# Patient Record
Sex: FEMALE | Race: WHITE | NOT HISPANIC OR LATINO | Employment: OTHER | URBAN - METROPOLITAN AREA
[De-identification: names, ages, dates, MRNs, and addresses within clinical notes are randomized per-mention and may not be internally consistent; named-entity substitution may affect disease eponyms.]

---

## 2018-01-10 NOTE — RESULT NOTES
Message   normal cyst     Verified Results  (1) TISSUE EXAM 18Apr2016 11:22AM Rosemary Metz Order Number: [id number]     Test Name Result Flag Reference   LAB AP CASE REPORT (Report)     Surgical Pathology Report             Case: [id number]                   Authorizing Provider: Roselia Frankel MD     Collected:      04/18/2016 1122        Pathologist:      Juan Mandel MD      Received:      04/19/2016 1146        Specimen:  Skin, Other, Posterior neck   LAB AP FINAL DIAGNOSIS      A  Skin, posterior neck, excision:  - Epidermal inclusion cyst, fragmented  Interpretation performed at [place] [address]  LAB AP SURGICAL ADDITIONAL INFORMATION (Report)     These tests were developed and their performance characteristics   determined by [address] [place] [address] or [employer]  They may not be cleared or approved by the [place]  Food and   Drug Administration  The FDA has determined that such clearance or   approval is not necessary  These tests are used for clinical purposes  They should not be regarded as investigational or for research  This   laboratory has been approved by [facility], designated as a high-complexity   laboratory and is qualified to perform these tests  LAB AP GROSS DESCRIPTION (Report)     A  The specimen is received in formalin, labeled with the patient's name   and hospital number, and is designated posterior neck, follicular cyst    The specimen consists of multiple ragged, light tan to yellow, irregular   shaped, portions of soft, fibromembranous, and focally fatty tissue,   suggestive of disrupted/ruptured, cystic structure, measuring in aggregate   2 4 x 2 3 x 0 4 centimeters  Entirely submitted  One cassette  Note: The estimated total formalin fixation time based upon information   provided by the submitting clinician and the standard processing schedule   is 57 25 hours   [facility]   LAB AP CLINICAL INFORMATION      TW Order Number: [id number]  Follicular cyst

## 2018-01-18 NOTE — RESULT NOTES
Verified Results  (1) TISSUE EXAM 18Apr2016 11:22AM Pete Hoover Order Number: [id number]     Test Name Result Flag Reference   LAB AP CASE REPORT (Report)     Surgical Pathology Report             Case: [id number]                   Authorizing Provider: Zachary Linton MD     Collected:      04/18/2016 1122        Pathologist:      Corinna Caicedo MD      Received:      04/19/2016 1146        Specimen:  Skin, Other, Posterior neck   LAB AP FINAL DIAGNOSIS      A  Skin, posterior neck, excision:  - Epidermal inclusion cyst, fragmented  Interpretation performed at [place] [address]  LAB AP SURGICAL ADDITIONAL INFORMATION (Report)     These tests were developed and their performance characteristics   determined by [facility] or [employer]  They may not be cleared or approved by the [place]  Food and   Drug Administration  The FDA has determined that such clearance or   approval is not necessary  These tests are used for clinical purposes  They should not be regarded as investigational or for research  This   laboratory has been approved by [place] [address], designated as a high-complexity   laboratory and is qualified to perform these tests  LAB AP GROSS DESCRIPTION (Report)     A  The specimen is received in formalin, labeled with the patient's name   and hospital number, and is designated posterior neck, follicular cyst    The specimen consists of multiple ragged, light tan to yellow, irregular   shaped, portions of soft, fibromembranous, and focally fatty tissue,   suggestive of disrupted/ruptured, cystic structure, measuring in aggregate   2 4 x 2 3 x 0 4 centimeters  Entirely submitted  One cassette  Note: The estimated total formalin fixation time based upon information   provided by the submitting clinician and the standard processing schedule   is 57 25 hours   [facility]   LAB AP CLINICAL INFORMATION      TW Order Number: [id number]  Follicular cyst

## 2018-03-27 LAB
ABSOL LYMPHOCYTES (HISTORICAL): 0.8 K/UL (ref 0.5–4)
ALBUMIN SERPL BCP-MCNC: 3.5 G/DL (ref 3–5.2)
ALP SERPL-CCNC: 120 U/L (ref 43–122)
ALT SERPL W P-5'-P-CCNC: 37 U/L (ref 9–52)
ANION GAP SERPL CALCULATED.3IONS-SCNC: 12 MMOL/L (ref 5–14)
AST SERPL W P-5'-P-CCNC: 33 U/L (ref 14–36)
BASOPHILS # BLD AUTO: 0 % (ref 0–1)
BASOPHILS # BLD AUTO: 0 K/UL (ref 0–0.1)
BILIRUB SERPL-MCNC: 2.7 MG/DL
BUN SERPL-MCNC: 13 MG/DL (ref 5–25)
CALCIUM SERPL-MCNC: 9.3 MG/DL (ref 8.4–10.2)
CHLORIDE SERPL-SCNC: 99 MEQ/L (ref 97–108)
CO2 SERPL-SCNC: 25 MMOL/L (ref 22–30)
CREATINE, SERUM (HISTORICAL): 0.6 MG/DL (ref 0.6–1.2)
DEPRECATED RDW RBC AUTO: 14.8 %
EGFR (HISTORICAL): >60 ML/MIN/1.73 M2
EOSINOPHIL # BLD AUTO: 0 K/UL (ref 0–0.4)
EOSINOPHIL NFR BLD AUTO: 0 % (ref 0–6)
GLUCOSE SERPL-MCNC: 258 MG/DL (ref 70–99)
GLUCOSE SERPL-MCNC: 277 MG/DL (ref 70–99)
GLUCOSE SERPL-MCNC: 298 MG/DL (ref 70–99)
GLUCOSE SERPL-MCNC: 350 MG/DL (ref 70–99)
HCT VFR BLD AUTO: 40.3 % (ref 36–46)
HGB BLD-MCNC: 13.3 G/DL (ref 12–16)
LYMPHOCYTES NFR BLD AUTO: 11 % (ref 25–45)
MCH RBC QN AUTO: 26.8 PG (ref 26–34)
MCHC RBC AUTO-ENTMCNC: 33.1 % (ref 31–36)
MCV RBC AUTO: 81 FL (ref 80–100)
MONOCYTES # BLD AUTO: 0.1 K/UL (ref 0.2–0.9)
MONOCYTES NFR BLD AUTO: 1 % (ref 1–10)
NEUTROPHILS ABS COUNT (HISTORICAL): 6.4 K/UL (ref 1.8–7.8)
NEUTS SEG NFR BLD AUTO: 88 % (ref 45–65)
PLATELET # BLD AUTO: 127 K/MCL (ref 150–450)
POTASSIUM SERPL-SCNC: 4.9 MEQ/L (ref 3.6–5)
RBC # BLD AUTO: 4.97 M/MCL (ref 4–5.2)
SODIUM SERPL-SCNC: 135 MEQ/L (ref 137–147)
TOTAL PROTEIN (HISTORICAL): 6.5 G/DL (ref 5.9–8.4)
WBC # BLD AUTO: 7.3 K/MCL (ref 4.5–11)

## 2018-10-28 ENCOUNTER — HOSPITAL ENCOUNTER (EMERGENCY)
Facility: HOSPITAL | Age: 53
Discharge: HOME | End: 2018-10-28
Attending: EMERGENCY MEDICINE | Admitting: EMERGENCY MEDICINE
Payer: MEDICARE

## 2018-10-28 ENCOUNTER — APPOINTMENT (EMERGENCY)
Dept: RADIOLOGY | Facility: HOSPITAL | Age: 53
End: 2018-10-28
Payer: MEDICARE

## 2018-10-28 VITALS
SYSTOLIC BLOOD PRESSURE: 192 MMHG | OXYGEN SATURATION: 99 % | TEMPERATURE: 98.3 F | DIASTOLIC BLOOD PRESSURE: 93 MMHG | HEART RATE: 92 BPM | RESPIRATION RATE: 19 BRPM

## 2018-10-28 DIAGNOSIS — M25.551 RIGHT HIP PAIN: Primary | ICD-10-CM

## 2018-10-28 PROCEDURE — 99283 EMERGENCY DEPT VISIT LOW MDM: CPT

## 2018-10-28 PROCEDURE — 73552 X-RAY EXAM OF FEMUR 2/>: CPT

## 2018-10-28 PROCEDURE — 73502 X-RAY EXAM HIP UNI 2-3 VIEWS: CPT

## 2018-10-28 RX ORDER — KETOROLAC TROMETHAMINE 30 MG/ML
15 INJECTION, SOLUTION INTRAMUSCULAR; INTRAVENOUS ONCE
Status: DISCONTINUED | OUTPATIENT
Start: 2018-10-28 | End: 2018-10-28

## 2018-10-28 RX ORDER — OXYCODONE HYDROCHLORIDE 5 MG/1
5 TABLET ORAL EVERY 4 HOURS PRN
Qty: 20 TABLET | Refills: 0 | Status: SHIPPED | OUTPATIENT
Start: 2018-10-28 | End: 2018-11-02

## 2018-10-28 RX ORDER — OXYCODONE HYDROCHLORIDE AND ACETAMINOPHEN 5; 325 MG/1; MG/1
1 TABLET ORAL ONCE
Status: COMPLETED | OUTPATIENT
Start: 2018-10-28 | End: 2018-10-28

## 2018-10-28 RX ORDER — NAPROXEN 250 MG/1
500 TABLET ORAL ONCE
Status: COMPLETED | OUTPATIENT
Start: 2018-10-28 | End: 2018-10-28

## 2018-10-28 RX ORDER — OXYCODONE HYDROCHLORIDE 5 MG/1
5 TABLET ORAL ONCE
Status: COMPLETED | OUTPATIENT
Start: 2018-10-28 | End: 2018-10-28

## 2018-10-28 RX ORDER — HYDROMORPHONE HCL/PF 1 MG/ML
1 SYRINGE (ML) INJECTION ONCE
Status: DISCONTINUED | OUTPATIENT
Start: 2018-10-28 | End: 2018-10-28

## 2018-10-28 RX ADMIN — OXYCODONE HYDROCHLORIDE 5 MG: 5 TABLET ORAL at 20:50

## 2018-10-28 RX ADMIN — NAPROXEN 500 MG: 250 TABLET ORAL at 20:06

## 2018-10-28 RX ADMIN — OXYCODONE HYDROCHLORIDE AND ACETAMINOPHEN 1 TABLET: 5; 325 TABLET ORAL at 20:06
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
## 2018-10-29 NOTE — DISCHARGE INSTRUCTIONS
Please return if your hip is stuck out he cannot get back in you worsening symptoms or other concerning symptoms otherwise please follow up with her surgeon Wednesday as planned as discussed    Hip Pain   WHAT YOU NEED TO KNOW:   Hip pain can be caused by a number of conditions, such as bursitis, arthritis, or muscle or tendon strain  X-rays do not show broken bones  You may have swelling in the fluid-filled sacs that protect your muscles and tendons  Hip pain can also be caused by a lower back problem  Hip pain may be caused by trauma, playing sports, or running  Your pain may start in your hip and go to your thigh, buttock, or groin  DISCHARGE INSTRUCTIONS:   Medicines:   · NSAIDs , such as ibuprofen, help decrease swelling, pain, and fever  This medicine is available with or without a doctor's order  NSAIDs can cause stomach bleeding or kidney problems in certain people  If you take blood thinner medicine, always ask your healthcare provider if NSAIDs are safe for you  Always read the medicine label and follow directions  · Take your medicine as directed  Contact your healthcare provider if you think your medicine is not helping or if you have side effects  Tell him of her if you are allergic to any medicine  Keep a list of the medicines, vitamins, and herbs you take  Include the amounts, and when and why you take them  Bring the list or the pill bottles to follow-up visits  Carry your medicine list with you in case of an emergency  Return to the emergency department if:   · Your pain gets worse  · You have numbness in your leg or toes  · You cannot put any weight on or move your hip  Contact your healthcare provider if:   · You have a fever  · Your pain does not decrease, even after treatment  · You have questions or concerns about your condition or care  Follow up with your healthcare provider as directed: You may need physical therapy, an injection, or more testing   You may need to see an orthopedic specialist  Write down your questions so you remember to ask them during your visits  Manage your hip pain:   · Rest  your injured hip so that it can heal  You may need to avoid putting any weight on your hip for at least 48 hours  Return to normal activities as directed  · Ice  the injury for 20 minutes every 4 hours, or as directed  Use an ice pack, or put crushed ice in a plastic bag  Cover it with a towel to protect your skin  Ice helps prevent tissue damage and decreases swelling and pain  · Elevate  your injured hip above the level of your heart as often as you can  This will help decrease swelling and pain  If possible, prop your hip and leg on pillows or blankets to keep the area elevated comfortably  · Maintain a healthy weight  Extra body weight can cause pressure and pain in your hip, knee, and ankle joints  Ask your healthcare provider how much you should weigh  Ask him to help you create a weight loss plan if you are overweight  · Use assistive devices as directed  You may need to use a cane or crutches  Assistive devices help decrease pain and pressure on your hip when you walk  Ask your healthcare provider for more information about assistive devices and how to use them correctly  © 2017 [employer] Information is for End User's use only and may not be sold, redistributed or otherwise used for commercial purposes  All illustrations and images included in CareNotes® are the copyrighted property of [employer]  or [address]  The above information is an  only  It is not intended as medical advice for individual conditions or treatments  Talk to your doctor, nurse or pharmacist before following any medical regimen to see if it is safe and effective for you

## 2018-11-28 ENCOUNTER — HOSPITAL ENCOUNTER (EMERGENCY)
Facility: HOSPITAL | Age: 53
Discharge: HOME | End: 2018-11-28
Attending: EMERGENCY MEDICINE | Admitting: EMERGENCY MEDICINE
Payer: MEDICARE

## 2018-11-28 VITALS
RESPIRATION RATE: 18 BRPM | OXYGEN SATURATION: 98 % | BODY MASS INDEX: 36.1 KG/M2 | SYSTOLIC BLOOD PRESSURE: 161 MMHG | TEMPERATURE: 98.8 F | HEIGHT: 67 IN | HEART RATE: 75 BPM | DIASTOLIC BLOOD PRESSURE: 72 MMHG | WEIGHT: 230 LBS

## 2018-11-28 DIAGNOSIS — M25.551 CHRONIC RIGHT HIP PAIN: Primary | ICD-10-CM

## 2018-11-28 DIAGNOSIS — G89.29 CHRONIC RIGHT HIP PAIN: Primary | ICD-10-CM

## 2018-11-28 PROCEDURE — 99283 EMERGENCY DEPT VISIT LOW MDM: CPT

## 2018-11-28 RX ORDER — OXYCODONE HYDROCHLORIDE 10 MG/1
10 TABLET ORAL ONCE
Status: COMPLETED | OUTPATIENT
Start: 2018-11-28 | End: 2018-11-28

## 2018-11-28 RX ORDER — OXYCODONE HYDROCHLORIDE AND ACETAMINOPHEN 5; 325 MG/1; MG/1
1 TABLET ORAL EVERY 6 HOURS PRN
Qty: 15 TABLET | Refills: 0 | Status: SHIPPED | OUTPATIENT
Start: 2018-11-28 | End: 2018-12-02

## 2018-11-28 RX ADMIN — OXYCODONE HYDROCHLORIDE 10 MG: 10 TABLET ORAL at 17:49

## 2018-11-28 NOTE — ED PROVIDER NOTES
History  Chief Complaint   Patient presents with    Hip Pain     Pt presents to ED with R hip pain down leg  Was here a month ago and diagnosed with a dislocation per patient  Pt states she ran out of her pain medication last night  Pt wants to know if "the femur hits the knee cap" stating her foot rolls when attempting transfer     49 yo F with complicated history of b/l hip problems  She has had numerous surgeries to both hips  She comes in today complaining of worsening R hip pain  She reports that she recently injured it and it dislocates and will not readily relocate as usual  She has seen her Orthopedic surgeon regarding this and she reports they told her there is nothing they can do and cannot do any surgeries  She denies new areas of pain  Has chronic b/l foot neuropathy with decreased sensation, unchanged  She is wheelchair bound  She reports she follows with her PCP, Ortho and pain management for this  She reports that she ran out of her pain medications last night and sees pain management in 3 days  Per records, pt seen in ED 1 month ago for exact same issue and was given small rx to last until appt  I discussed with pt t length that she cannot use the ED for this as this is overall a chronic problem  Per drug database, last refill was 1 month ago  I gave small refill to last until appt in 3 days  I gave paperwork regarding chronic pain and she was told to follow with her doctors rather than using the ED  On exam, hip dislocates and relocates and given pt report that ortho told her there is nothing they can do, I do not feel an x-ray I necessary at this time            Prior to Admission Medications   Prescriptions Last Dose Informant Patient Reported? Taking?    ALPRAZolam (XANAX) 2 MG tablet   Yes No   Sig: Take by mouth   albuterol (VENTOLIN HFA) 90 mcg/act inhaler   Yes No   Sig: Two puffs every four hours as needed for wheezing   insulin aspart (NovoLOG) 100 Units/mL SOPN   Yes No   Sig: Inject under the skin   metFORMIN (GLUCOPHAGE) 1000 MG tablet   Yes No   Sig: Take by mouth   oxyCODONE (ROXICODONE) 30 MG immediate release tablet   Yes No   Sig: Take by mouth      Facility-Administered Medications: None       Past Medical History:   Diagnosis Date    Asthma     Diabetes mellitus ([address] )     Disease of thyroid gland     Hyperlipidemia     Hypertension     Migraine     Psychiatric disorder     anxiety, depression       Past Surgical History:   Procedure Laterality Date    HIP ARTHROPLASTY      KNEE ARTHROPLASTY      NECK SURGERY      SHOULDER ARTHROSCOPY         History reviewed  No pertinent family history  I have reviewed and agree with the history as documented  Social History   Substance Use Topics    Smoking status: Former Smoker    Smokeless tobacco: Never Used    Alcohol use No        Review of Systems   Constitutional: Negative for chills, fever and unexpected weight change  HENT: Negative for ear pain, rhinorrhea and sore throat  Eyes: Negative for pain and visual disturbance  Respiratory: Negative for cough and shortness of breath  Cardiovascular: Negative for chest pain and leg swelling  Gastrointestinal: Negative for abdominal pain, constipation, diarrhea, nausea and vomiting  Endocrine: Negative for polydipsia, polyphagia and polyuria  Genitourinary: Negative for dysuria, frequency, hematuria and urgency  Musculoskeletal: Negative for back pain, myalgias and neck pain  Skin: Negative for color change and rash  Allergic/Immunologic: Negative for environmental allergies and immunocompromised state  Neurological: Negative for dizziness, weakness, light-headedness and headaches  Hematological: Negative for adenopathy  Does not bruise/bleed easily  Psychiatric/Behavioral: Negative for agitation and confusion  All other systems reviewed and are negative  Physical Exam  Physical Exam   Constitutional: She is oriented to person, place, and time   She appears well-developed and well-nourished  HENT:   Head: Normocephalic and atraumatic  Nose: Nose normal    Mouth/Throat: Oropharynx is clear and moist    Eyes: Conjunctivae and EOM are normal    Neck: Normal range of motion  Neck supple  Cardiovascular: Normal rate, regular rhythm, normal heart sounds and intact distal pulses  Pulmonary/Chest: Effort normal and breath sounds normal  No stridor  No respiratory distress  She has no wheezes  She has no rales  She exhibits no tenderness  Abdominal: Soft  She exhibits no distension  There is no tenderness  There is no rebound and no guarding  Musculoskeletal: She exhibits deformity  She exhibits no edema  R hip: Pt sitting in bed with R leg internally rotated and can palpate hip that is dislocated, when pt straightens her leg out, the hip reduces  TTP over hip  No skin changes/swelling  Distally with decreased sensation that she reports is baseline  Normal cap refill, skin warm/pink  L hip is absent can pt can lift leg up to her face  Neurological: She is alert and oriented to person, place, and time  She exhibits normal muscle tone  Coordination normal    Skin: Skin is warm and dry  No rash noted  Psychiatric: She has a normal mood and affect  Judgment and thought content normal    Nursing note and vitals reviewed        Vital Signs  ED Triage Vitals [11/28/18 1601]   Temperature Pulse Respirations Blood Pressure SpO2   98 8 °F (37 1 °C) 75 18 140/75 97 %      Temp Source Heart Rate Source Patient Position - Orthostatic VS BP Location FiO2 (%)   Oral Monitor Sitting Left arm --      Pain Score       9           Vitals:    11/28/18 1601   BP: 140/75   Pulse: 75   Patient Position - Orthostatic VS: Sitting       Visual Acuity      ED Medications  Medications - No data to display    Diagnostic Studies  Results Reviewed     None                 No orders to display              Procedures  Procedures       Phone Contacts  ED Phone Contact    ED Course MDM  Number of Diagnoses or Management Options  Chronic right hip pain:   Diagnosis management comments: 47 yo F with chronic R hip pain with recurrent hip dislocations/reduction  Pt to f/u with PCP, ortho and pain management       Amount and/or Complexity of Data Reviewed  Tests in the radiology section of CPT®: reviewed  Review and summarize past medical records: yes      CritCare Time    Disposition  Final diagnoses:   None     ED Disposition     None      Follow-up Information    None         Patient's Medications   Discharge Prescriptions    No medications on file     No discharge procedures on file      ED Provider  Electronically Signed by           Bubba Simental DO  11/29/18 6328

## 2018-11-28 NOTE — DISCHARGE INSTRUCTIONS
Hip Pain   WHAT YOU NEED TO KNOW:   Hip pain can be caused by a number of conditions, such as bursitis, arthritis, or muscle or tendon strain  X-rays do not show broken bones  You may have swelling in the fluid-filled sacs that protect your muscles and tendons  Hip pain can also be caused by a lower back problem  Hip pain may be caused by trauma, playing sports, or running  Your pain may start in your hip and go to your thigh, buttock, or groin  DISCHARGE INSTRUCTIONS:   Medicines:   · NSAIDs , such as ibuprofen, help decrease swelling, pain, and fever  This medicine is available with or without a doctor's order  NSAIDs can cause stomach bleeding or kidney problems in certain people  If you take blood thinner medicine, always ask your healthcare provider if NSAIDs are safe for you  Always read the medicine label and follow directions  · Take your medicine as directed  Contact your healthcare provider if you think your medicine is not helping or if you have side effects  Tell him of her if you are allergic to any medicine  Keep a list of the medicines, vitamins, and herbs you take  Include the amounts, and when and why you take them  Bring the list or the pill bottles to follow-up visits  Carry your medicine list with you in case of an emergency  Return to the emergency department if:   · Your pain gets worse  · You have numbness in your leg or toes  · You cannot put any weight on or move your hip  Contact your healthcare provider if:   · You have a fever  · Your pain does not decrease, even after treatment  · You have questions or concerns about your condition or care  Follow up with your healthcare provider as directed: You may need physical therapy, an injection, or more testing  You may need to see an orthopedic specialist  Write down your questions so you remember to ask them during your visits    Manage your hip pain:   · Rest  your injured hip so that it can heal  You may need to avoid putting any weight on your hip for at least 48 hours  Return to normal activities as directed  · Ice  the injury for 20 minutes every 4 hours, or as directed  Use an ice pack, or put crushed ice in a plastic bag  Cover it with a towel to protect your skin  Ice helps prevent tissue damage and decreases swelling and pain  · Elevate  your injured hip above the level of your heart as often as you can  This will help decrease swelling and pain  If possible, prop your hip and leg on pillows or blankets to keep the area elevated comfortably  · Maintain a healthy weight  Extra body weight can cause pressure and pain in your hip, knee, and ankle joints  Ask your healthcare provider how much you should weigh  Ask him to help you create a weight loss plan if you are overweight  · Use assistive devices as directed  You may need to use a cane or crutches  Assistive devices help decrease pain and pressure on your hip when you walk  Ask your healthcare provider for more information about assistive devices and how to use them correctly  © 2017 [facility] Information is for End User's use only and may not be sold, redistributed or otherwise used for commercial purposes  All illustrations and images included in CareNotes® are the copyrighted property of A D A M , Inc  or Miki Hernandez  The above information is an  only  It is not intended as medical advice for individual conditions or treatments  Talk to your doctor, nurse or pharmacist before following any medical regimen to see if it is safe and effective for you

## 2019-04-14 ENCOUNTER — APPOINTMENT (EMERGENCY)
Dept: RADIOLOGY | Facility: HOSPITAL | Age: 54
End: 2019-04-14
Payer: MEDICARE

## 2019-04-14 ENCOUNTER — HOSPITAL ENCOUNTER (EMERGENCY)
Facility: HOSPITAL | Age: 54
Discharge: HOME | End: 2019-04-14
Attending: EMERGENCY MEDICINE | Admitting: EMERGENCY MEDICINE
Payer: MEDICARE

## 2019-04-14 VITALS
SYSTOLIC BLOOD PRESSURE: 127 MMHG | HEART RATE: 99 BPM | OXYGEN SATURATION: 93 % | DIASTOLIC BLOOD PRESSURE: 63 MMHG | RESPIRATION RATE: 20 BRPM | TEMPERATURE: 99 F

## 2019-04-14 DIAGNOSIS — R07.81 RIB PAIN ON RIGHT SIDE: Primary | ICD-10-CM

## 2019-04-14 LAB
ATRIAL RATE: 96 BPM
P AXIS: 46 DEGREES
PR INTERVAL: 202 MS
QRS AXIS: -1 DEGREES
QRSD INTERVAL: 90 MS
QT INTERVAL: 366 MS
QTC INTERVAL: 462 MS
T WAVE AXIS: 11 DEGREES
VENTRICULAR RATE: 96 BPM

## 2019-04-14 PROCEDURE — 93010 ELECTROCARDIOGRAM REPORT: CPT | Performed by: INTERNAL MEDICINE

## 2019-04-14 PROCEDURE — 99284 EMERGENCY DEPT VISIT MOD MDM: CPT

## 2019-04-14 PROCEDURE — 96372 THER/PROPH/DIAG INJ SC/IM: CPT

## 2019-04-14 PROCEDURE — 99284 EMERGENCY DEPT VISIT MOD MDM: CPT | Performed by: PHYSICIAN ASSISTANT

## 2019-04-14 PROCEDURE — 93005 ELECTROCARDIOGRAM TRACING: CPT

## 2019-04-14 PROCEDURE — 71101 X-RAY EXAM UNILAT RIBS/CHEST: CPT

## 2019-04-14 RX ORDER — HYDROCODONE BITARTRATE AND ACETAMINOPHEN 5; 325 MG/1; MG/1
1 TABLET ORAL EVERY 6 HOURS PRN
Qty: 8 TABLET | Refills: 0 | Status: SHIPPED | OUTPATIENT
Start: 2019-04-14 | End: 2019-04-22

## 2019-04-14 RX ORDER — IBUPROFEN 400 MG/1
400 TABLET ORAL EVERY 6 HOURS PRN
Qty: 20 TABLET | Refills: 0 | Status: SHIPPED | OUTPATIENT
Start: 2019-04-14 | End: 2019-08-03

## 2019-04-14 RX ORDER — HYDROCODONE BITARTRATE AND ACETAMINOPHEN 5; 325 MG/1; MG/1
1 TABLET ORAL ONCE
Status: COMPLETED | OUTPATIENT
Start: 2019-04-14 | End: 2019-04-14

## 2019-04-14 RX ORDER — METHOCARBAMOL 500 MG/1
500 TABLET, FILM COATED ORAL ONCE
Status: COMPLETED | OUTPATIENT
Start: 2019-04-14 | End: 2019-04-14

## 2019-04-14 RX ORDER — LISINOPRIL 20 MG/1
1 TABLET ORAL DAILY
COMMUNITY

## 2019-04-14 RX ORDER — LIDOCAINE 50 MG/G
1 PATCH TOPICAL DAILY
Qty: 30 PATCH | Refills: 0 | Status: SHIPPED | OUTPATIENT
Start: 2019-04-14 | End: 2019-08-03

## 2019-04-14 RX ORDER — LIDOCAINE 50 MG/G
1 PATCH TOPICAL ONCE
Status: DISCONTINUED | OUTPATIENT
Start: 2019-04-14 | End: 2019-04-14 | Stop reason: HOSPADM

## 2019-04-14 RX ORDER — KETOROLAC TROMETHAMINE 30 MG/ML
15 INJECTION, SOLUTION INTRAMUSCULAR; INTRAVENOUS ONCE
Status: COMPLETED | OUTPATIENT
Start: 2019-04-14 | End: 2019-04-14

## 2019-04-14 RX ADMIN — HYDROCODONE BITARTRATE AND ACETAMINOPHEN 1 TABLET: 5; 325 TABLET ORAL at 03:25

## 2019-04-14 RX ADMIN — KETOROLAC TROMETHAMINE 15 MG: 30 INJECTION, SOLUTION INTRAMUSCULAR at 01:35

## 2019-04-14 RX ADMIN — LIDOCAINE 1 PATCH: 50 PATCH TOPICAL at 02:11

## 2019-04-14 RX ADMIN — METHOCARBAMOL TABLETS 500 MG: 500 TABLET, COATED ORAL at 01:35

## 2019-05-11 ENCOUNTER — HOSPITAL ENCOUNTER (EMERGENCY)
Facility: HOSPITAL | Age: 54
Discharge: HOME | End: 2019-05-11
Attending: EMERGENCY MEDICINE | Admitting: EMERGENCY MEDICINE
Payer: MEDICARE

## 2019-05-11 ENCOUNTER — APPOINTMENT (EMERGENCY)
Dept: RADIOLOGY | Facility: HOSPITAL | Age: 54
End: 2019-05-11
Payer: MEDICARE

## 2019-05-11 VITALS
SYSTOLIC BLOOD PRESSURE: 157 MMHG | TEMPERATURE: 98.5 F | OXYGEN SATURATION: 96 % | BODY MASS INDEX: 42.51 KG/M2 | RESPIRATION RATE: 16 BRPM | WEIGHT: 271.39 LBS | HEART RATE: 80 BPM | DIASTOLIC BLOOD PRESSURE: 78 MMHG

## 2019-05-11 DIAGNOSIS — R07.89 CHEST WALL PAIN: Primary | ICD-10-CM

## 2019-05-11 PROCEDURE — 71101 X-RAY EXAM UNILAT RIBS/CHEST: CPT

## 2019-05-11 PROCEDURE — 72100 X-RAY EXAM L-S SPINE 2/3 VWS: CPT

## 2019-05-11 PROCEDURE — 99283 EMERGENCY DEPT VISIT LOW MDM: CPT

## 2019-05-11 PROCEDURE — 99283 EMERGENCY DEPT VISIT LOW MDM: CPT | Performed by: PHYSICIAN ASSISTANT

## 2019-05-11 RX ORDER — LIDOCAINE 50 MG/G
1 PATCH TOPICAL ONCE
Status: DISCONTINUED | OUTPATIENT
Start: 2019-05-11 | End: 2019-05-11 | Stop reason: HOSPADM

## 2019-05-11 RX ORDER — OXYCODONE HYDROCHLORIDE 10 MG/1
10 TABLET ORAL ONCE
Status: COMPLETED | OUTPATIENT
Start: 2019-05-11 | End: 2019-05-11

## 2019-05-11 RX ORDER — CYCLOBENZAPRINE HCL 5 MG
5 TABLET ORAL 3 TIMES DAILY PRN
Qty: 30 TABLET | Refills: 0 | Status: SHIPPED | OUTPATIENT
Start: 2019-05-11 | End: 2019-08-03

## 2019-05-11 RX ADMIN — LIDOCAINE 1 PATCH: 50 PATCH TOPICAL at 20:45

## 2019-05-11 RX ADMIN — DICLOFENAC 2 G: 10 GEL TOPICAL at 20:48

## 2019-05-11 RX ADMIN — OXYCODONE HYDROCHLORIDE 10 MG: 10 TABLET ORAL at 20:43

## 2019-05-11 RX ADMIN — OXYCODONE HYDROCHLORIDE 10 MG: 10 TABLET ORAL at 18:43

## 2019-06-28 ENCOUNTER — APPOINTMENT (EMERGENCY)
Dept: RADIOLOGY | Facility: HOSPITAL | Age: 54
End: 2019-06-28
Payer: MEDICARE

## 2019-06-28 ENCOUNTER — HOSPITAL ENCOUNTER (EMERGENCY)
Facility: HOSPITAL | Age: 54
Discharge: HOME | End: 2019-06-28
Attending: EMERGENCY MEDICINE | Admitting: EMERGENCY MEDICINE
Payer: MEDICARE

## 2019-06-28 VITALS
HEIGHT: 68 IN | SYSTOLIC BLOOD PRESSURE: 144 MMHG | WEIGHT: 230 LBS | HEART RATE: 84 BPM | DIASTOLIC BLOOD PRESSURE: 83 MMHG | BODY MASS INDEX: 34.86 KG/M2 | OXYGEN SATURATION: 98 % | RESPIRATION RATE: 20 BRPM

## 2019-06-28 DIAGNOSIS — S79.912A INJURY OF LEFT HIP, INITIAL ENCOUNTER: Primary | ICD-10-CM

## 2019-06-28 PROCEDURE — 73552 X-RAY EXAM OF FEMUR 2/>: CPT

## 2019-06-28 PROCEDURE — 72170 X-RAY EXAM OF PELVIS: CPT

## 2019-06-28 PROCEDURE — 99283 EMERGENCY DEPT VISIT LOW MDM: CPT

## 2019-06-28 PROCEDURE — 99283 EMERGENCY DEPT VISIT LOW MDM: CPT | Performed by: EMERGENCY MEDICINE

## 2019-06-28 RX ORDER — ACETAMINOPHEN 325 MG/1
650 TABLET ORAL EVERY 6 HOURS PRN
Qty: 30 TABLET | Refills: 0 | Status: SHIPPED | OUTPATIENT
Start: 2019-06-28 | End: 2019-08-03

## 2019-06-28 RX ORDER — OXYCODONE HYDROCHLORIDE 10 MG/1
10 TABLET ORAL EVERY 6 HOURS PRN
Qty: 8 TABLET | Refills: 0 | Status: SHIPPED | OUTPATIENT
Start: 2019-06-28 | End: 2019-06-30

## 2019-06-28 RX ORDER — OXYCODONE HYDROCHLORIDE 10 MG/1
10 TABLET ORAL ONCE
Status: COMPLETED | OUTPATIENT
Start: 2019-06-28 | End: 2019-06-28

## 2019-06-28 RX ORDER — METHOCARBAMOL 750 MG/1
750 TABLET, FILM COATED ORAL 4 TIMES DAILY PRN
Qty: 28 TABLET | Refills: 0 | Status: SHIPPED | OUTPATIENT
Start: 2019-06-28 | End: 2019-08-03

## 2019-06-28 RX ORDER — OXYCODONE HYDROCHLORIDE 5 MG/1
5 TABLET ORAL ONCE
Status: COMPLETED | OUTPATIENT
Start: 2019-06-28 | End: 2019-06-28

## 2019-06-28 RX ADMIN — OXYCODONE HYDROCHLORIDE 10 MG: 10 TABLET ORAL at 16:22

## 2019-06-28 RX ADMIN — OXYCODONE HYDROCHLORIDE 5 MG: 5 TABLET ORAL at 17:09

## 2019-06-28 NOTE — ED PROVIDER NOTES
History  Chief Complaint   Patient presents with    Hip Pain     Pt states she hit a bump yesterday and her left thigh is burning  states she has no hip on that side and she thinks the bone is rubbing against bone     49-year-old female history of diabetes previous trauma with multiple hip and pelvic surgeries here for evaluation of left hip pain  Patient was in her car yesterday and moved an awkward position she follow-up pop and tearing in her left posterior hip that radiates into her left anterior thigh, it is worse with any position changes is better with her home medication although it is much worse than her typical chronic pain it is without other exacerbating remitting factors she has no other associated symptoms including abdominal or pelvic pain change in bowels or bladder saddle anesthesia weakness paresthesias or anesthesia unilateral leg swelling skin temperature or color changes she has no other complaints or concerns otherwise denies a complete review systems as noted          Prior to Admission Medications   Prescriptions Last Dose Informant Patient Reported? Taking?    ALPRAZolam (XANAX) 2 MG tablet   Yes No   Sig: Take by mouth   cyclobenzaprine (FLEXERIL) 5 mg tablet   No No   Sig: Take 1 tablet (5 mg total) by mouth 3 (three) times a day as needed for muscle spasms for up to 30 doses   ibuprofen (MOTRIN) 400 mg tablet   No No   Sig: Take 1 tablet (400 mg total) by mouth every 6 (six) hours as needed for mild pain for up to 5 days   insulin aspart (NovoLOG) 100 Units/mL SOPN   Yes No   Sig: Inject under the skin   insulin detemir (LEVEMIR) 100 units/mL subcutaneous injection   Yes No   Sig: Inject under the skin   lidocaine (LIDODERM) 5 %   No No   Sig: Apply 1 patch topically daily Remove & Discard patch within 12 hours or as directed by MD   lisinopril (ZESTRIL) 20 mg tablet   Yes No   Sig: Take 1 tablet by mouth daily   metFORMIN (GLUCOPHAGE) 1000 MG tablet   Yes No   Sig: Take by mouth oxyCODONE (ROXICODONE) 30 MG immediate release tablet   Yes No   Sig: Take by mouth      Facility-Administered Medications: None       Past Medical History:   Diagnosis Date    Asthma     Diabetes mellitus ([address] )     Disease of thyroid gland     Hyperlipidemia     Hypertension     Migraine     Psychiatric disorder     anxiety, depression       Past Surgical History:   Procedure Laterality Date    HIP ARTHROPLASTY      KNEE ARTHROPLASTY      NECK SURGERY      SHOULDER ARTHROSCOPY         History reviewed  No pertinent family history  I have reviewed and agree with the history as documented  Social History     Tobacco Use    Smoking status: Former Smoker    Smokeless tobacco: Never Used   Substance Use Topics    Alcohol use: No    Drug use: No        Review of Systems   Constitutional: Negative for activity change, appetite change and fever  Gastrointestinal: Negative for abdominal pain, constipation, diarrhea, nausea and vomiting  Genitourinary: Negative for decreased urine volume, difficulty urinating, flank pain and urgency  Musculoskeletal: Positive for gait problem  Negative for back pain, neck pain and neck stiffness  Left hip pain   Skin: Negative for rash and wound  Neurological: Negative for dizziness, weakness, numbness and headaches  Hematological: Negative for adenopathy  Does not bruise/bleed easily  Psychiatric/Behavioral: Negative for agitation and behavioral problems  All other systems reviewed and are negative  Physical Exam  Physical Exam   Constitutional: She is oriented to person, place, and time  She appears well-developed and well-nourished  No distress  Clinically well sitting upright conversational no acute distress   HENT:   Head: Normocephalic and atraumatic  Eyes: Pupils are equal, round, and reactive to light  EOM are normal    Neck: Normal range of motion  Neck supple  No tracheal deviation present     Cardiovascular: Normal rate, regular rhythm and normal heart sounds  Exam reveals no gallop and no friction rub  No murmur heard  Pulmonary/Chest: Effort normal and breath sounds normal  She has no wheezes  She has no rales  Abdominal: Soft  Bowel sounds are normal  She exhibits no distension  There is no tenderness  There is no rebound and no guarding  Musculoskeletal:   Patient has pain with range of motion of her left hip and over her left proximal thigh there are no overlying skin changes no warmth or erythema the distal lower extremities are well perfused sensations intact motor is intact distally without other acute ischemic infectious inflammatory traumatic findings there is no pain over her left knee quadriceps is intact she has no midline back tenderness her muscle skeletal exam is otherwise unremarkable   Neurological: She is alert and oriented to person, place, and time  No cranial nerve deficit  She exhibits normal muscle tone  Coordination normal    Skin: Skin is warm and dry  No rash noted  Psychiatric: She has a normal mood and affect  Her behavior is normal    Nursing note and vitals reviewed  Vital Signs  ED Triage Vitals [06/28/19 1510]   Temp Pulse Respirations Blood Pressure SpO2   -- 84 20 144/83 98 %      Temp Source Heart Rate Source Patient Position - Orthostatic VS BP Location FiO2 (%)   Oral Monitor Sitting Left arm --      Pain Score       8           Vitals:    06/28/19 1510   BP: 144/83   Pulse: 84   Patient Position - Orthostatic VS: Sitting         Visual Acuity      ED Medications  Medications   oxyCODONE (ROXICODONE) immediate release tablet 10 mg (10 mg Oral Given 6/28/19 1622)   oxyCODONE (ROXICODONE) IR tablet 5 mg (5 mg Oral Given 6/28/19 1709)       Diagnostic Studies  Results Reviewed     None                 XR femur 2 views LEFT   ED Interpretation by Mirian Livingston DO (06/28 1654)   No acute abnormality      Final Result by Jovan Vitale MD (06/28 1914)      1   Left femoral head resorption and post surgical changes to the left hip again identified  is again identified  2   Proximal tibial plate and screws identified transfixing proximal tibia plateau comminuted fracture  Workstation performed: [id number]         XR pelvis ap only 1 or 2 vw   ED Interpretation by Flori Yao DO (06/28 1654)   No acute abnormality      Final Result by Cece Tineo MD (06/28 1700)      1  No evidence of an acute osseous abnormality  2   Revision bilateral total hip arthroplasties as described, not substantially changed  Workstation performed: [id number]                    Procedures  Procedures       ED Course  ED Course as of Jun 29 0200 Fri Jun 28, 2019   1702 Patient has chronic pain, multiple multiple failed surgeries with severe, severe polytrauma she does not frequent the emergency department, she reports an acute injury, she is not a surgical candidate going forward, she has seen multiple orthopedists, she is seeking symptom control counseled on the appropriate use of narcotic pain medicine, after discussion will treat her acute pain, counseled to return to her family doctor or pain management doctor for acute flares return for injuries, return instructions, agreeable plan                                  MDM    Disposition  Final diagnoses:   Injury of left hip, initial encounter     Time reflects when diagnosis was documented in both MDM as applicable and the Disposition within this note     Time User Action Codes Description Comment    6/28/2019  5:04 PM Edwige Vraela Add [[id number]] Injury of left hip, initial encounter       ED Disposition     ED Disposition Condition Date/Time Comment    Discharge Stable Fri Jun 28, 2019  5:03 PM Joss Rodriguez discharge to home/self care              Follow-up Information     Follow up With Specialties Details Why Contact Info Additional [address] [facility] Emergency Department Emergency Medicine  If symptoms worsen [address]  [phone] [place] ED, [address] [place], [place], [address], DO Internal Medicine In 3 days  [address]  Professor Myles [place] [address]  [phone]             Discharge Medication List as of 6/28/2019  5:07 PM      START taking these medications    Details   acetaminophen (TYLENOL) 325 mg tablet Take 2 tablets (650 mg total) by mouth every 6 (six) hours as needed for mild pain, Starting Fri 6/28/2019, Print      methocarbamol (ROBAXIN) 750 mg tablet Take 1 tablet (750 mg total) by mouth 4 (four) times a day as needed for muscle spasms for up to 7 days, Starting Fri 6/28/2019, Until Fri 7/5/2019, Print      !! oxyCODONE (ROXICODONE) 10 MG TABS Take 1 tablet (10 mg total) by mouth every 6 (six) hours as needed for moderate pain for up to 2 daysMax Daily Amount: 40 mg, Starting Fri 6/28/2019, Until Sun 6/30/2019, Print       !! - Potential duplicate medications found  Please discuss with provider        CONTINUE these medications which have NOT CHANGED    Details   ALPRAZolam (XANAX) 2 MG tablet Take by mouth, Starting 12/17/2014, Until Discontinued, Historical Med      cyclobenzaprine (FLEXERIL) 5 mg tablet Take 1 tablet (5 mg total) by mouth 3 (three) times a day as needed for muscle spasms for up to 30 doses, Starting Sat 5/11/2019, Print      ibuprofen (MOTRIN) 400 mg tablet Take 1 tablet (400 mg total) by mouth every 6 (six) hours as needed for mild pain for up to 5 days, Starting Sun 4/14/2019, Until Fri 4/19/2019, Print      insulin aspart (NovoLOG) 100 Units/mL SOPN Inject under the skin, Starting 3/24/2015, Until Discontinued, Historical Med      insulin detemir (LEVEMIR) 100 units/mL subcutaneous injection Inject under the skin, Historical Med      lidocaine (LIDODERM) 5 % Apply 1 patch topically daily Remove & Discard patch within 12 hours or as directed by MD, Starting Sun 4/14/2019, Print      lisinopril (ZESTRIL) 20 mg tablet Take 1 tablet by mouth daily, Historical Med      metFORMIN (GLUCOPHAGE) 1000 MG tablet Take by mouth, Starting 3/20/2015, Until Discontinued, Historical Med      !! oxyCODONE (ROXICODONE) 30 MG immediate release tablet Take by mouth, Starting 11/11/2013, Until Discontinued, Historical Med       !! - Potential duplicate medications found  Please discuss with provider  No discharge procedures on file      ED Provider  Electronically Signed by           Ovi Rose DO  06/29/19 0673

## 2019-06-28 NOTE — DISCHARGE INSTRUCTIONS
Please return if you have worsening or other concerning symptoms otherwise follow up with doctors as instructed

## 2019-07-01 ENCOUNTER — APPOINTMENT (EMERGENCY)
Dept: CT IMAGING | Facility: HOSPITAL | Age: 54
End: 2019-07-01
Payer: MEDICARE

## 2019-07-01 ENCOUNTER — HOSPITAL ENCOUNTER (EMERGENCY)
Facility: HOSPITAL | Age: 54
Discharge: HOME | End: 2019-07-01
Attending: EMERGENCY MEDICINE
Payer: MEDICARE

## 2019-07-01 VITALS
DIASTOLIC BLOOD PRESSURE: 84 MMHG | HEART RATE: 60 BPM | RESPIRATION RATE: 16 BRPM | BODY MASS INDEX: 34.86 KG/M2 | OXYGEN SATURATION: 99 % | SYSTOLIC BLOOD PRESSURE: 178 MMHG | HEIGHT: 68 IN | WEIGHT: 230 LBS

## 2019-07-01 DIAGNOSIS — R93.429 ABNORMAL CT SCAN, KIDNEY: ICD-10-CM

## 2019-07-01 DIAGNOSIS — R93.7 ABNORMAL CT SCAN, LUMBAR SPINE: ICD-10-CM

## 2019-07-01 DIAGNOSIS — M25.551 RIGHT HIP PAIN: Primary | ICD-10-CM

## 2019-07-01 LAB
ALBUMIN SERPL BCP-MCNC: 3.1 G/DL (ref 3.5–5)
ALP SERPL-CCNC: 121 U/L (ref 46–116)
ALT SERPL W P-5'-P-CCNC: 21 U/L (ref 12–78)
ANION GAP SERPL CALCULATED.3IONS-SCNC: 9 MMOL/L (ref 4–13)
AST SERPL W P-5'-P-CCNC: 19 U/L (ref 5–45)
BASOPHILS # BLD AUTO: 0.04 THOUSANDS/ΜL (ref 0–0.1)
BASOPHILS NFR BLD AUTO: 1 % (ref 0–1)
BILIRUB SERPL-MCNC: 1.1 MG/DL (ref 0.2–1)
BUN SERPL-MCNC: 19 MG/DL (ref 5–25)
CALCIUM SERPL-MCNC: 8.7 MG/DL (ref 8.3–10.1)
CHLORIDE SERPL-SCNC: 104 MMOL/L (ref 100–108)
CO2 SERPL-SCNC: 27 MMOL/L (ref 21–32)
CREAT SERPL-MCNC: 0.79 MG/DL (ref 0.6–1.3)
EOSINOPHIL # BLD AUTO: 0.12 THOUSAND/ΜL (ref 0–0.61)
EOSINOPHIL NFR BLD AUTO: 2 % (ref 0–6)
ERYTHROCYTE [DISTWIDTH] IN BLOOD BY AUTOMATED COUNT: 13.2 % (ref 11.6–15.1)
GFR SERPL CREATININE-BSD FRML MDRD: 86 ML/MIN/1.73SQ M
GLUCOSE SERPL-MCNC: 196 MG/DL (ref 65–140)
HCG SERPL QL: NEGATIVE
HCT VFR BLD AUTO: 43.1 % (ref 34.8–46.1)
HGB BLD-MCNC: 14.4 G/DL (ref 11.5–15.4)
IMM GRANULOCYTES # BLD AUTO: 0.02 THOUSAND/UL (ref 0–0.2)
IMM GRANULOCYTES NFR BLD AUTO: 0 % (ref 0–2)
LIPASE SERPL-CCNC: 127 U/L (ref 73–393)
LYMPHOCYTES # BLD AUTO: 1.77 THOUSANDS/ΜL (ref 0.6–4.47)
LYMPHOCYTES NFR BLD AUTO: 31 % (ref 14–44)
MCH RBC QN AUTO: 28.2 PG (ref 26.8–34.3)
MCHC RBC AUTO-ENTMCNC: 33.4 G/DL (ref 31.4–37.4)
MCV RBC AUTO: 85 FL (ref 82–98)
MONOCYTES # BLD AUTO: 0.39 THOUSAND/ΜL (ref 0.17–1.22)
MONOCYTES NFR BLD AUTO: 7 % (ref 4–12)
NEUTROPHILS # BLD AUTO: 3.34 THOUSANDS/ΜL (ref 1.85–7.62)
NEUTS SEG NFR BLD AUTO: 59 % (ref 43–75)
NRBC BLD AUTO-RTO: 0 /100 WBCS
PLATELET # BLD AUTO: 112 THOUSANDS/UL (ref 149–390)
PMV BLD AUTO: 11.4 FL (ref 8.9–12.7)
POTASSIUM SERPL-SCNC: 4 MMOL/L (ref 3.5–5.3)
PROT SERPL-MCNC: 7.3 G/DL (ref 6.4–8.2)
RBC # BLD AUTO: 5.1 MILLION/UL (ref 3.81–5.12)
SODIUM SERPL-SCNC: 140 MMOL/L (ref 136–145)
WBC # BLD AUTO: 5.68 THOUSAND/UL (ref 4.31–10.16)

## 2019-07-01 PROCEDURE — 80053 COMPREHEN METABOLIC PANEL: CPT | Performed by: PHYSICIAN ASSISTANT

## 2019-07-01 PROCEDURE — 72131 CT LUMBAR SPINE W/O DYE: CPT

## 2019-07-01 PROCEDURE — 99284 EMERGENCY DEPT VISIT MOD MDM: CPT

## 2019-07-01 PROCEDURE — 85025 COMPLETE CBC W/AUTO DIFF WBC: CPT | Performed by: PHYSICIAN ASSISTANT

## 2019-07-01 PROCEDURE — 99284 EMERGENCY DEPT VISIT MOD MDM: CPT | Performed by: PHYSICIAN ASSISTANT

## 2019-07-01 PROCEDURE — 96372 THER/PROPH/DIAG INJ SC/IM: CPT

## 2019-07-01 PROCEDURE — 84703 CHORIONIC GONADOTROPIN ASSAY: CPT | Performed by: PHYSICIAN ASSISTANT

## 2019-07-01 PROCEDURE — 36415 COLL VENOUS BLD VENIPUNCTURE: CPT | Performed by: PHYSICIAN ASSISTANT

## 2019-07-01 PROCEDURE — 83690 ASSAY OF LIPASE: CPT | Performed by: PHYSICIAN ASSISTANT

## 2019-07-01 RX ORDER — OXYCODONE HYDROCHLORIDE AND ACETAMINOPHEN 5; 325 MG/1; MG/1
1 TABLET ORAL ONCE
Status: COMPLETED | OUTPATIENT
Start: 2019-07-01 | End: 2019-07-01

## 2019-07-01 RX ORDER — KETOROLAC TROMETHAMINE 30 MG/ML
15 INJECTION, SOLUTION INTRAMUSCULAR; INTRAVENOUS ONCE
Status: COMPLETED | OUTPATIENT
Start: 2019-07-01 | End: 2019-07-01

## 2019-07-01 RX ADMIN — OXYCODONE HYDROCHLORIDE AND ACETAMINOPHEN 1 TABLET: 5; 325 TABLET ORAL at 06:17

## 2019-07-01 RX ADMIN — KETOROLAC TROMETHAMINE 15 MG: 30 INJECTION, SOLUTION INTRAMUSCULAR at 05:01

## 2019-07-01 NOTE — ED NOTES
Pt made aware urine sample is needed  Unable to provide  Provider made aware        Jennifer Ruiz RN  07/01/19 2898

## 2019-07-01 NOTE — ED PROVIDER NOTES
History  Chief Complaint   Patient presents with    Flank Pain     Pt co left flank pain that radiates down her left leg to her knee as well as front of abdomen that started 3 days ago  Patient is a 15-year-old female with history of hip arthroplasty, knee arthroplasty, asthma, diabetes mellitus, hyperlipidemia, hypertension, and disease of thyroid gland that presents emergency department with gradual onset persistent worsening left hip pain with radiation to left foot for 3 days  Patient denies associated symptomatology  Patient experiences chronic daily pain at present  Patient history of lower extremity surgeries in the past   Patient currently seeing pain specialist and has appointment with pain specialist on Tuesday, 07/02/2019  Patient with recent ED visit for symptomatology similar to current ED presentation this morning; and was treated with oxycodone, x-rays identified no acute osseous abnormality, patient was discharged to home care with follow-up to PCP with prescriptions of Robaxin, Tylenol, and Percocet with ED return precautions in place  Patient returns emergency department for evaluation of left hip pain  Patient affirms palliative factors of oxycodone with provocative factors of pressure to left hip  Patient denies not effective treatment  Patient denies fevers, chills, nausea, vomiting  Patient denies diarrhea, constipation, urinary symptoms  Patient denies recent fall and recent trauma  Patient denies headaches, tinnitus, dizziness, meningeal, and vertiginous symptoms  Patient denies sick contacts or recent travel  Patient denies numbness and loss of power  Patient affirms positional tingling; left lower extremity  Patient ambulates by way of wheelchair currently  Patient denies chest pain, shortness of breath, and abdominal pain  Patient is not in acute distress        History provided by:  Patient and relative   used: No        Prior to Admission Medications   Prescriptions Last Dose Informant Patient Reported? Taking?    ALPRAZolam (XANAX) 2 MG tablet   Yes Yes   Sig: Take by mouth   acetaminophen (TYLENOL) 325 mg tablet Not Taking at Unknown time  No No   Sig: Take 2 tablets (650 mg total) by mouth every 6 (six) hours as needed for mild pain   Patient not taking: Reported on 7/1/2019   acetaminophen (TYLENOL) 325 mg tablet Not Taking at Unknown time  No No   Sig: Take 2 tablets (650 mg total) by mouth every 6 (six) hours as needed for mild pain   Patient not taking: Reported on 7/1/2019   cyclobenzaprine (FLEXERIL) 5 mg tablet Not Taking at Unknown time  No No   Sig: Take 1 tablet (5 mg total) by mouth 3 (three) times a day as needed for muscle spasms for up to 30 doses   Patient not taking: Reported on 7/1/2019   ibuprofen (MOTRIN) 400 mg tablet   No No   Sig: Take 1 tablet (400 mg total) by mouth every 6 (six) hours as needed for mild pain for up to 5 days   insulin aspart (NovoLOG) 100 Units/mL SOPN   Yes Yes   Sig: Inject under the skin    insulin detemir (LEVEMIR) 100 units/mL subcutaneous injection   Yes Yes   Sig: Inject 45 Units under the skin 2 (two) times a day    lidocaine (LIDODERM) 5 %   No No   Sig: Apply 1 patch topically daily Remove & Discard patch within 12 hours or as directed by MD   lisinopril (ZESTRIL) 20 mg tablet   Yes Yes   Sig: Take 1 tablet by mouth daily   metFORMIN (GLUCOPHAGE) 1000 MG tablet   Yes Yes   Sig: Take by mouth   methocarbamol (ROBAXIN) 750 mg tablet Not Taking at Unknown time  No No   Sig: Take 1 tablet (750 mg total) by mouth 4 (four) times a day as needed for muscle spasms for up to 7 days   Patient not taking: Reported on 7/1/2019   oxyCODONE (ROXICODONE) 10 MG TABS   No No   Sig: Take 1 tablet (10 mg total) by mouth every 6 (six) hours as needed for moderate pain for up to 2 daysMax Daily Amount: 40 mg   oxyCODONE (ROXICODONE) 30 MG immediate release tablet   Yes Yes   Sig: Take by mouth Facility-Administered Medications: None       Past Medical History:   Diagnosis Date    Asthma     Diabetes mellitus ([address] )     Disease of thyroid gland     Hyperlipidemia     Hypertension     Migraine     Psychiatric disorder     anxiety, depression       Past Surgical History:   Procedure Laterality Date    HIP ARTHROPLASTY      KNEE ARTHROPLASTY      NECK SURGERY      SHOULDER ARTHROSCOPY         History reviewed  No pertinent family history  I have reviewed and agree with the history as documented  Social History     Tobacco Use    Smoking status: Former Smoker    Smokeless tobacco: Never Used   Substance Use Topics    Alcohol use: No    Drug use: No        Review of Systems   Constitutional: Negative for activity change, appetite change, chills and fever  HENT: Negative for congestion, postnasal drip, rhinorrhea, sinus pressure, sinus pain, sore throat and tinnitus  Eyes: Negative for photophobia and visual disturbance  Respiratory: Negative for cough, chest tightness and shortness of breath  Cardiovascular: Negative for chest pain and palpitations  Gastrointestinal: Negative for abdominal pain, constipation, diarrhea, nausea and vomiting  Genitourinary: Negative for difficulty urinating, dysuria, flank pain, frequency and urgency  Musculoskeletal: Positive for arthralgias  Negative for back pain, gait problem, neck pain and neck stiffness  Left hip pain   Skin: Negative for pallor and rash  Allergic/Immunologic: Negative for environmental allergies and food allergies  Neurological: Negative for dizziness, weakness, numbness and headaches  Psychiatric/Behavioral: Negative for confusion  All other systems reviewed and are negative  Physical Exam  Physical Exam   Constitutional: She is oriented to person, place, and time  She appears well-developed and well-nourished  She is active and cooperative  Non-toxic appearance   She does not have a sickly appearance  She does not appear ill  No distress  Patient appropriate for physical examination  Patient in no acute distress  Patient with verbalization using 6-8 word sentences of current symptomatology leading to ED presentation  Patient crying at time of initial ED evaluation  Patient is accompanied by her daughter at this time  HENT:   Head: Normocephalic and atraumatic  Right Ear: Hearing and external ear normal  No drainage, swelling or tenderness  No mastoid tenderness  No decreased hearing is noted  Left Ear: Hearing and external ear normal  No drainage, swelling or tenderness  No mastoid tenderness  No decreased hearing is noted  Nose: Nose normal    Mouth/Throat: Uvula is midline, oropharynx is clear and moist and mucous membranes are normal    Eyes: Pupils are equal, round, and reactive to light  Conjunctivae, EOM and lids are normal  Right eye exhibits no discharge  Left eye exhibits no discharge  Neck: Trachea normal, normal range of motion, full passive range of motion without pain and phonation normal  Neck supple  No JVD present  No tracheal tenderness, no spinous process tenderness and no muscular tenderness present  No neck rigidity  No tracheal deviation and normal range of motion present  No tenderness with passive and active cervical ROM with head turning approximately 45 degree angles to the left and right  No cervical tenderness with cranial axial loading     Cardiovascular: Normal rate, regular rhythm, normal heart sounds, intact distal pulses and normal pulses  Pulses:       Radial pulses are 2+ on the right side, and 2+ on the left side  Dorsalis pedis pulses are 2+ on the right side, and 2+ on the left side  Posterior tibial pulses are 2+ on the right side, and 2+ on the left side  Pulmonary/Chest: Effort normal and breath sounds normal  No stridor  She has no decreased breath sounds  She has no wheezes  She has no rhonchi  She has no rales   She exhibits no tenderness, no bony tenderness and no crepitus  Abdominal: Soft  Bowel sounds are normal  She exhibits no distension  There is no tenderness  There is no rigidity, no rebound, no guarding and no CVA tenderness  Musculoskeletal:        Left hip: She exhibits decreased range of motion, tenderness, bony tenderness and swelling  She exhibits no laceration  Passive ROM intact  Upper and lower extremity 4/5 bilaterally  Neurovascularly intact  No grinding or clicking of joints    Patient has no overlying epidermal rashes, lesions, erythema, ecchymosis, or abrasion left hip  Skin intact  Lymphadenopathy:        Head (right side): No submental, no submandibular, no tonsillar, no preauricular, no posterior auricular and no occipital adenopathy present  Head (left side): No submental, no submandibular, no tonsillar, no preauricular, no posterior auricular and no occipital adenopathy present  She has no cervical adenopathy  Right cervical: No superficial cervical, no deep cervical and no posterior cervical adenopathy present  Left cervical: No superficial cervical, no deep cervical and no posterior cervical adenopathy present  Neurological: She is alert and oriented to person, place, and time  She has normal strength and normal reflexes  No sensory deficit  GCS eye subscore is 4  GCS verbal subscore is 5  GCS motor subscore is 6  Reflex Scores:       Patellar reflexes are 2+ on the right side and 2+ on the left side  Skin: Skin is warm and intact  Capillary refill takes less than 2 seconds  She is not diaphoretic  Psychiatric: She has a normal mood and affect  Her speech is normal and behavior is normal  Judgment and thought content normal  Cognition and memory are normal    Nursing note and vitals reviewed        Vital Signs  ED Triage Vitals [07/01/19 0353]   Temp Pulse Respirations Blood Pressure SpO2   -- 79 20 157/82 99 %      Temp Source Heart Rate Source Patient Position - Orthostatic VS BP Location FiO2 (%)   Oral Monitor Sitting Right arm --      Pain Score       Worst Possible Pain           Vitals:    07/01/19 0353 07/01/19 0618   BP: 157/82 (!) 178/84   Pulse: 79 60   Patient Position - Orthostatic VS: Sitting Lying         Visual Acuity      ED Medications  Medications   ketorolac (TORADOL) injection 15 mg (15 mg Intramuscular Given 7/1/19 0501)   oxyCODONE-acetaminophen (PERCOCET) 5-325 mg per tablet 1 tablet (1 tablet Oral Given 7/1/19 0617)       Diagnostic Studies  Results Reviewed     Procedure Component Value Units Date/Time    hCG, qualitative pregnancy [[id number]]  (Normal) Collected:  07/01/19 0505    Lab Status:  Final result Specimen:  Blood from Hand, Left Updated:  07/01/19 0549     Preg, Serum Negative    Comprehensive metabolic panel [[id number]]  (Abnormal) Collected:  07/01/19 0505    Lab Status:  Final result Specimen:  Blood from Hand, Left Updated:  07/01/19 0527     Sodium 140 mmol/L      Potassium 4 0 mmol/L      Chloride 104 mmol/L      CO2 27 mmol/L      ANION GAP 9 mmol/L      BUN 19 mg/dL      Creatinine 0 79 mg/dL      Glucose 196 mg/dL      Calcium 8 7 mg/dL      AST 19 U/L      ALT 21 U/L      Alkaline Phosphatase 121 U/L      Total Protein 7 3 g/dL      Albumin 3 1 g/dL      Total Bilirubin 1 10 mg/dL      eGFR 86 ml/min/1 73sq m     Narrative:       Tamia guidelines for Chronic Kidney Disease (CKD):     Stage 1 with normal or high GFR (GFR > 90 mL/min/1 73 square meters)    Stage 2 Mild CKD (GFR = 60-89 mL/min/1 73 square meters)    Stage 3A Moderate CKD (GFR = 45-59 mL/min/1 73 square meters)    Stage 3B Moderate CKD (GFR = 30-44 mL/min/1 73 square meters)    Stage 4 Severe CKD (GFR = 15-29 mL/min/1 73 square meters)    Stage 5 End Stage CKD (GFR <15 mL/min/1 73 square meters)  Note: GFR calculation is accurate only with a steady state creatinine    Lipase [[id number]]  (Normal) Collected:  07/01/19 0505 Lab Status:  Final result Specimen:  Blood from Hand, Left Updated:  07/01/19 0524     Lipase 127 u/L     CBC and differential [[id number]]  (Abnormal) Collected:  07/01/19 0505    Lab Status:  Final result Specimen:  Blood from Hand, Left Updated:  07/01/19 0510     WBC 5 68 Thousand/uL      RBC 5 10 Million/uL      Hemoglobin 14 4 g/dL      Hematocrit 43 1 %      MCV 85 fL      MCH 28 2 pg      MCHC 33 4 g/dL      RDW 13 2 %      MPV 11 4 fL      Platelets 031 Thousands/uL      nRBC 0 /100 WBCs      Neutrophils Relative 59 %      Immat GRANS % 0 %      Lymphocytes Relative 31 %      Monocytes Relative 7 %      Eosinophils Relative 2 %      Basophils Relative 1 %      Neutrophils Absolute 3 34 Thousands/µL      Immature Grans Absolute 0 02 Thousand/uL      Lymphocytes Absolute 1 77 Thousands/µL      Monocytes Absolute 0 39 Thousand/µL      Eosinophils Absolute 0 12 Thousand/µL      Basophils Absolute 0 04 Thousands/µL                  CT lumbar spine without contrast   Final Result by Igor Donald MD (07/01 0706)      No acute osseous abnormality  Broad disc bulge at L3-4 causing moderate central spinal stenosis  3 5 cm exophytic lesion at the upper pole left kidney, possibly a complex cyst however ultrasound recommended to exclude neoplasm  The study was marked in EPIC for significant notification  Workstation performed: [id number]                    Procedures  Procedures       ED Course  ED Course as of Jul 01 1742 Mon Jul 01, 2019   5015 Patient verbalizes that she has taken half of dose Oxycodone (15mg) at 2200 and 0000; patient verbalizes no current pain abatement with the aforementioned      0433 Patient verbalizes that Dr Gali Swenson, Pain Management at [facility]  this Tuesday, July 2, 2019      0530 Patient verbalizes that her right hip pain 7/10 from a 12/10 pain s/p toradol delivery      0624 CT lumbar spine without contrast - No acute osseous abnormality    Broad disc bulge at L3-4 causing moderate central spinal stenosis  3 5 cm exophytic lesion at the upper pole left kidney, possibly a complex cyst however ultrasound recommended to exclude neoplasm  MDM  Number of Diagnoses or Management Options  Abnormal CT scan, kidney: new and requires workup  Abnormal CT scan, lumbar spine: established and worsening  Right hip pain: established and worsening     Amount and/or Complexity of Data Reviewed  Clinical lab tests: ordered and reviewed  Tests in the radiology section of CPT®: ordered and reviewed  Review and summarize past medical records: yes  Discuss the patient with other providers: yes (Dr Naveen Cantu)  Independent visualization of images, tracings, or specimens: yes    Risk of Complications, Morbidity, and/or Mortality  Presenting problems: low    Patient Progress  Patient progress: stable       Patient is a 12-year-old female with history of hip arthroplasty, knee arthroplasty, asthma, diabetes mellitus, hyperlipidemia, hypertension, and disease of thyroid gland that presents emergency department with gradual onset persistent worsening left hip pain with radiation to left foot for 3 days  Patient with recent x-rays; will perform lumbar CT to further identified pathological process  Delivered Toradol and Percocet emergency department; patient verbalized decrease in left hip pain symptomatology status post medication delivery  Clinical blood labs; hyperglycemia at this time is confirmed verbally by patient; baseline  Patient has history of diabetes mellitus  Quantitative HCG negative  Lumbar CT denoted L3-L4 disc bulge with moderate spinal stenosis which is most likely the etiology of patient's current left hip pain recount up with ED symptomatology  Additionally complex cyst on left kidney had been noted on lumbar CT scan  Patient GFR and CR values normal; renal involvement less likely    Patient with verbalization, Doe Ang that you gave me was only 5 mg, instead of 10 mg "  patient verbalized decrease in left hip pain symptomatology status post Percocet delivery  Patient now not crying as was at initial evaluation  Instructed patient to continue medications as prescribed by ED provider on 06/28/2019  Follow-up with Orthopedics  Follow-up with nephrology  Follow-up with PCP  Follow up with emergency department symptoms persist or exacerbate  Patient demonstrates verbal understanding of all clinical imaging and clinical laboratory findings, discharge instructions, follow-up, and treatment plan  ED RN that had been caring for patient at this time had made arrangements by telephone to have patient safely taken to her home location via wheelchair van  Patient in no acute distress and hemodynamically stable verbalizing no current left hip pain at time of discharge    Disposition  Final diagnoses:   Right hip pain   Abnormal CT scan, kidney - Complex cyst on left kidney   Abnormal CT scan, lumbar spine - Broad disc bulge at L3-4 causing moderate central spinal stenosis     Time reflects when diagnosis was documented in both MDM as applicable and the Disposition within this note     Time User Action Codes Description Comment    7/1/2019  6:28 AM Sherra Reaper Add [M25 551] Right hip pain     7/1/2019  6:29 AM Sherra Reaper Add [R93 429] Abnormal CT scan, kidney     7/1/2019  6:29 AM Sherra Reaper Modify [R93 429] Abnormal CT scan, kidney Complex cyst on left kidney    7/1/2019  6:31 AM Sherra Reaper Add [R93 7] Abnormal CT scan, lumbar spine     7/1/2019  6:31 AM Sherra Reaper Modify [R93 7] Abnormal CT scan, lumbar spine Broad disc bulge at L3-4 causing moderate central spinal stenosis      ED Disposition     ED Disposition Condition Date/Time Comment    Discharge Stable Mon Jul 1, 2019  6:28 AM Nicol Martinez discharge to home/self care              Follow-up Information     Follow up With Specialties Details Why Contact Info Additional [address] [facility] Orthopedic Surgery Call in 2 days for further evaluation of symptoms [facility] [address]  Nephrology Nephrology Call in 1 week for further evaluation of symptoms [address] [facility] [address] [facility]  [facility] Medicine Call in 1 week As needed [address] [facility] [address]       [facility] Emergency Medicine Go to  As needed [address] [facility] [address] [phone] [place] ED, [address] [place], [place], [address]          Discharge Medication List as of 7/1/2019  6:33 AM      CONTINUE these medications which have NOT CHANGED    Details   ALPRAZolam Brunetta Aldo) 2 MG tablet Take by mouth, Starting 12/17/2014, Until Discontinued, Historical Med      insulin aspart (NovoLOG) 100 Units/mL SOPN Inject under the skin , Starting Tue 3/24/2015, Historical Med      insulin detemir (LEVEMIR) 100 units/mL subcutaneous injection Inject 45 Units under the skin 2 (two) times a day , Historical Med      lisinopril (ZESTRIL) 20 mg tablet Take 1 tablet by mouth daily, Historical Med      metFORMIN (GLUCOPHAGE) 1000 MG tablet Take by mouth, Starting 3/20/2015, Until Discontinued, Historical Med      oxyCODONE (ROXICODONE) 30 MG immediate release tablet Take by mouth, Starting 11/11/2013, Until Discontinued, Historical Med      !! acetaminophen (TYLENOL) 325 mg tablet Take 2 tablets (650 mg total) by mouth every 6 (six) hours as needed for mild pain, Starting Fri 6/28/2019, Print      !! acetaminophen (TYLENOL) 325 mg tablet Take 2 tablets (650 mg total) by mouth every 6 (six) hours as needed for mild pain, Starting Fri 6/28/2019, Print      cyclobenzaprine (FLEXERIL) 5 mg tablet Take 1 tablet (5 mg total) by mouth 3 (three) times a day as needed for muscle spasms for up to 30 doses, Starting Sat 5/11/2019, Print      ibuprofen (MOTRIN) 400 mg tablet Take 1 tablet (400 mg total) by mouth every 6 (six) hours as needed for mild pain for up to 5 days, Starting Sun 4/14/2019, Until Fri 4/19/2019, Print      lidocaine (LIDODERM) 5 % Apply 1 patch topically daily Remove & Discard patch within 12 hours or as directed by MD, Starting Sun 4/14/2019, Print      methocarbamol (ROBAXIN) 750 mg tablet Take 1 tablet (750 mg total) by mouth 4 (four) times a day as needed for muscle spasms for up to 7 days, Starting Fri 6/28/2019, Until Fri 7/5/2019, Print       !! - Potential duplicate medications found  Please discuss with provider  No discharge procedures on file      ED Provider  Electronically Signed by           Chiqui Riggs PA-C  07/01/19 [address], [place]  07/01/19 1744       Chiqui Riggs PA-C  07/01/19 1748

## 2019-07-01 NOTE — DISCHARGE INSTRUCTIONS
Take oxycodone as prescribed by ED provider on 6/28/2019  Follow-up with pain management specialist  Follow-up with nephrology - Follow-up with ultrasound of left kidney  Follow-up with Orthopedics  Follow-up with PCP  Follow up with emergency department symptoms persist or exacerbate

## 2019-07-05 ENCOUNTER — HOSPITAL ENCOUNTER (EMERGENCY)
Facility: HOSPITAL | Age: 54
Discharge: HOME | End: 2019-07-05
Attending: EMERGENCY MEDICINE
Payer: MEDICARE

## 2019-07-05 VITALS
TEMPERATURE: 98.2 F | DIASTOLIC BLOOD PRESSURE: 89 MMHG | SYSTOLIC BLOOD PRESSURE: 191 MMHG | RESPIRATION RATE: 20 BRPM | HEART RATE: 72 BPM | WEIGHT: 229.28 LBS | HEIGHT: 68 IN | BODY MASS INDEX: 34.75 KG/M2 | OXYGEN SATURATION: 98 %

## 2019-07-05 DIAGNOSIS — M54.42 LEFT-SIDED LOW BACK PAIN WITH LEFT-SIDED SCIATICA, UNSPECIFIED CHRONICITY: Primary | ICD-10-CM

## 2019-07-05 PROCEDURE — 96372 THER/PROPH/DIAG INJ SC/IM: CPT

## 2019-07-05 PROCEDURE — 99283 EMERGENCY DEPT VISIT LOW MDM: CPT | Performed by: EMERGENCY MEDICINE

## 2019-07-05 PROCEDURE — 99283 EMERGENCY DEPT VISIT LOW MDM: CPT

## 2019-07-05 RX ORDER — OXYCODONE HYDROCHLORIDE 5 MG/1
10 TABLET ORAL EVERY 6 HOURS PRN
Qty: 5 TABLET | Refills: 0 | Status: SHIPPED | OUTPATIENT
Start: 2019-07-05 | End: 2019-07-05 | Stop reason: SDUPTHER

## 2019-07-05 RX ORDER — NAPROXEN 500 MG/1
500 TABLET ORAL 2 TIMES DAILY WITH MEALS
Qty: 30 TABLET | Refills: 0 | Status: SHIPPED | OUTPATIENT
Start: 2019-07-05 | End: 2019-08-03

## 2019-07-05 RX ORDER — OXYCODONE HYDROCHLORIDE 10 MG/1
10 TABLET ORAL ONCE
Status: COMPLETED | OUTPATIENT
Start: 2019-07-05 | End: 2019-07-05

## 2019-07-05 RX ORDER — OXYCODONE HYDROCHLORIDE 5 MG/1
10 TABLET ORAL EVERY 6 HOURS PRN
Qty: 5 TABLET | Refills: 0 | Status: SHIPPED | OUTPATIENT
Start: 2019-07-05 | End: 2019-07-06

## 2019-07-05 RX ORDER — KETOROLAC TROMETHAMINE 30 MG/ML
15 INJECTION, SOLUTION INTRAMUSCULAR; INTRAVENOUS ONCE
Status: COMPLETED | OUTPATIENT
Start: 2019-07-05 | End: 2019-07-05

## 2019-07-05 RX ADMIN — OXYCODONE HYDROCHLORIDE 10 MG: 10 TABLET ORAL at 09:27

## 2019-07-05 RX ADMIN — KETOROLAC TROMETHAMINE 15 MG: 30 INJECTION, SOLUTION INTRAMUSCULAR at 09:28
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
## 2019-07-08 ENCOUNTER — HOSPITAL ENCOUNTER (EMERGENCY)
Facility: HOSPITAL | Age: 54
Discharge: HOME | End: 2019-07-08
Attending: EMERGENCY MEDICINE | Admitting: EMERGENCY MEDICINE
Payer: MEDICARE

## 2019-07-08 VITALS
OXYGEN SATURATION: 96 % | WEIGHT: 272.27 LBS | HEART RATE: 62 BPM | SYSTOLIC BLOOD PRESSURE: 170 MMHG | DIASTOLIC BLOOD PRESSURE: 85 MMHG | RESPIRATION RATE: 16 BRPM | BODY MASS INDEX: 42.73 KG/M2 | HEIGHT: 67 IN | TEMPERATURE: 98.6 F

## 2019-07-08 DIAGNOSIS — M54.42 CHRONIC LEFT-SIDED LOW BACK PAIN WITH LEFT-SIDED SCIATICA: Primary | ICD-10-CM

## 2019-07-08 DIAGNOSIS — G89.29 CHRONIC LEFT-SIDED LOW BACK PAIN WITH LEFT-SIDED SCIATICA: Primary | ICD-10-CM

## 2019-07-08 PROCEDURE — 96372 THER/PROPH/DIAG INJ SC/IM: CPT

## 2019-07-08 PROCEDURE — 99283 EMERGENCY DEPT VISIT LOW MDM: CPT

## 2019-07-08 PROCEDURE — 99283 EMERGENCY DEPT VISIT LOW MDM: CPT | Performed by: PHYSICIAN ASSISTANT

## 2019-07-08 RX ORDER — ACETAMINOPHEN 325 MG/1
650 TABLET ORAL ONCE
Status: COMPLETED | OUTPATIENT
Start: 2019-07-08 | End: 2019-07-08

## 2019-07-08 RX ORDER — LIDOCAINE 50 MG/G
1 PATCH TOPICAL ONCE
Status: DISCONTINUED | OUTPATIENT
Start: 2019-07-08 | End: 2019-07-08 | Stop reason: HOSPADM

## 2019-07-08 RX ORDER — KETOROLAC TROMETHAMINE 30 MG/ML
15 INJECTION, SOLUTION INTRAMUSCULAR; INTRAVENOUS ONCE
Status: COMPLETED | OUTPATIENT
Start: 2019-07-08 | End: 2019-07-08

## 2019-07-08 RX ADMIN — ACETAMINOPHEN 650 MG: 325 TABLET, FILM COATED ORAL at 05:55

## 2019-07-08 RX ADMIN — KETOROLAC TROMETHAMINE 15 MG: 30 INJECTION, SOLUTION INTRAMUSCULAR at 05:54

## 2019-07-08 RX ADMIN — LIDOCAINE 1 PATCH: 50 PATCH TOPICAL at 05:54

## 2019-07-08 NOTE — ED NOTES
Patient requires her own wheel chair for mobility, pt waiting on ride      Reynold Fernando, [facility]  07/08/19 3060 wheelchair

## 2019-07-08 NOTE — DISCHARGE INSTRUCTIONS
Take naproxen sodium as indicated by provider  Follow-up with Orthopedic surgery  Follow-up with [facility]  Follow-up with PCP  Follow up emergency department symptoms persist or exacerbate

## 2019-07-08 NOTE — ED PROVIDER NOTES
History  Chief Complaint   Patient presents with    Back Pain     Lower back pain radiates down buttocks to left knee, started last night at 2300, last week pt was diagnosed with bulging discs L2-L3, pain management prescribed steroids and next appointment is not until July 16  Current pain medications do not relieve pain  Patient is a 49-year-old female history of knee arthroplasty, hip arthroplasty, asthma, neck surgery, diabetes mellitus, hyperlipidemia, and hypertension that presents emergency department with gradual onset dull and achy left lower lumbar pain with radiation to left foot for 6 hours  Patient states that she had taken 30 mg oxycodone at 1:00 a m  this morning, which had proven unsuccessful in mitigating her current pain level  Patient verbalized that she had a recent visit with her pain specialist who had delivered her prednisone taper and gabapentin, with 1st day of prednisone taper providing most relief, and subsequent days on prednisone with failure to provide any persistent left lower lumbar pain relief  Patient verbalizes 30 mg of oxycodone ain't touching this pain    Patient verbalizes she "has a appointment with Dr Gera Martins", who is her orthopedic surgeon for consultation this Friday  Patient with verbalization understanding get this pain under control, I do not want to go home with a script "  Patient has multiple ER visits for evaluation for similar symptomatology to current ED presentation of left lower lumbar pain  Patient denies saddle paresthesias, and bowel incontinence  Patient has bladder incontinence at baseline  Patient denies fevers, chills, nausea, vomiting  Patient denies diarrhea, constipation, urinary symptoms  Patient denies headaches, tinnitus, dizziness, meningeal, and vertiginous symptoms  Patient denies numbness, and loss of power  Patient denies recent fall or recent trauma  Patient denies sick contacts or recent travel    Patient denies chest pain, shortness of breath, abdominal pain  Patient is not in acute distress  History provided by:  Patient   used: No    Back Pain   Location:  Lumbar spine  Quality:  Aching and shooting  Radiates to:  L foot  Pain severity:  Mild  Onset quality:  Gradual  Duration:  6 days  Timing:  Constant  Progression:  Worsening  Chronicity:  Chronic  Context: not emotional stress, not falling, not jumping from heights, not lifting heavy objects, not MCA, not MVA, not occupational injury, not pedestrian accident, not physical stress, not recent illness, not recent injury and not twisting    Relieved by:  Nothing  Worsened by:  Palpation and movement  Ineffective treatments:  Narcotics (Oxycodone)  Associated symptoms: no abdominal pain, no abdominal swelling, no bowel incontinence, no chest pain, no dysuria, no fever, no headaches, no numbness, no paresthesias, no pelvic pain, no perianal numbness, no tingling, no weakness and no weight loss    Risk factors: no hx of cancer, no hx of osteoporosis, no recent surgery, no steroid use and no vascular disease        Prior to Admission Medications   Prescriptions Last Dose Informant Patient Reported? Taking?    ALPRAZolam (XANAX) 2 MG tablet Past Week at Unknown time  Yes Yes   Sig: Take by mouth   acetaminophen (TYLENOL) 325 mg tablet   No No   Sig: Take 2 tablets (650 mg total) by mouth every 6 (six) hours as needed for mild pain   Patient not taking: Reported on 7/1/2019   acetaminophen (TYLENOL) 325 mg tablet   No No   Sig: Take 2 tablets (650 mg total) by mouth every 6 (six) hours as needed for mild pain   Patient not taking: Reported on 7/1/2019   cyclobenzaprine (FLEXERIL) 5 mg tablet   No No   Sig: Take 1 tablet (5 mg total) by mouth 3 (three) times a day as needed for muscle spasms for up to 30 doses   Patient not taking: Reported on 7/1/2019   ibuprofen (MOTRIN) 400 mg tablet   No No   Sig: Take 1 tablet (400 mg total) by mouth every 6 (six) hours as needed for mild pain for up to 5 days   insulin aspart (NovoLOG) 100 Units/mL SOPN 7/7/2019 at Unknown time  Yes Yes   Sig: Inject under the skin    insulin detemir (LEVEMIR) 100 units/mL subcutaneous injection 7/7/2019 at Unknown time  Yes Yes   Sig: Inject 45 Units under the skin 2 (two) times a day    lidocaine (LIDODERM) 5 % Not Taking at Unknown time  No No   Sig: Apply 1 patch topically daily Remove & Discard patch within 12 hours or as directed by MD   Patient not taking: Reported on 7/8/2019   lisinopril (ZESTRIL) 20 mg tablet   Yes No   Sig: Take 1 tablet by mouth daily   metFORMIN (GLUCOPHAGE) 1000 MG tablet 7/7/2019 at Unknown time  Yes Yes   Sig: Take by mouth   methocarbamol (ROBAXIN) 750 mg tablet   No No   Sig: Take 1 tablet (750 mg total) by mouth 4 (four) times a day as needed for muscle spasms for up to 7 days   Patient not taking: Reported on 7/1/2019   naproxen (NAPROSYN) 500 mg tablet 7/7/2019 at Unknown time  No Yes   Sig: Take 1 tablet (500 mg total) by mouth 2 (two) times a day with meals for 7 days   oxyCODONE (ROXICODONE) 30 MG immediate release tablet 7/8/2019 at 0100  Yes Yes   Sig: Take by mouth      Facility-Administered Medications: None       Past Medical History:   Diagnosis Date    Asthma     Diabetes mellitus ([facility] [address] )     Disease of thyroid gland     Hyperlipidemia     Hypertension     Migraine     Psychiatric disorder     anxiety, depression       Past Surgical History:   Procedure Laterality Date    HIP ARTHROPLASTY      KNEE ARTHROPLASTY      NECK SURGERY      SHOULDER ARTHROSCOPY         History reviewed  No pertinent family history  I have reviewed and agree with the history as documented      Social History     Tobacco Use    Smoking status: Former Smoker    Smokeless tobacco: Never Used   Substance Use Topics    Alcohol use: No     Frequency: Never    Drug use: No        Review of Systems   Constitutional: Negative for activity change, appetite change, chills, fever and weight loss  HENT: Negative for congestion, postnasal drip, rhinorrhea, sinus pressure, sinus pain, sore throat and tinnitus  Eyes: Negative for photophobia and visual disturbance  Respiratory: Negative for cough, chest tightness and shortness of breath  Cardiovascular: Negative for chest pain and palpitations  Gastrointestinal: Negative for abdominal pain, bowel incontinence, constipation, diarrhea, nausea and vomiting  Genitourinary: Negative for difficulty urinating, dysuria, flank pain, frequency, pelvic pain and urgency  Musculoskeletal: Positive for back pain  Negative for gait problem, neck pain and neck stiffness  Skin: Negative for pallor and rash  Allergic/Immunologic: Negative for environmental allergies and food allergies  Neurological: Negative for dizziness, tingling, weakness, numbness, headaches and paresthesias  Psychiatric/Behavioral: Negative for confusion  All other systems reviewed and are negative  Physical Exam  Physical Exam   Constitutional: She is oriented to person, place, and time  She appears well-developed and well-nourished  She is active and cooperative  Non-toxic appearance  She does not have a sickly appearance  She does not appear ill  No distress  Patient appropriate for physical examination  Patient in no acute distress  Patient with verbalization using 6-8 word sentences of current symptomatology leading to ED presentation  Patient hunched forward rocking back and forth     HENT:   Head: Normocephalic and atraumatic  Right Ear: Hearing and external ear normal  No drainage, swelling or tenderness  No mastoid tenderness  No decreased hearing is noted  Left Ear: Hearing and external ear normal  No drainage, swelling or tenderness  No mastoid tenderness  No decreased hearing is noted     Nose: Nose normal    Mouth/Throat: Uvula is midline, oropharynx is clear and moist and mucous membranes are normal    Eyes: Pupils are equal, round, and reactive to light  Conjunctivae, EOM and lids are normal  Right eye exhibits no discharge  Left eye exhibits no discharge  Neck: Trachea normal, normal range of motion, full passive range of motion without pain and phonation normal  Neck supple  No JVD present  No tracheal tenderness, no spinous process tenderness and no muscular tenderness present  No neck rigidity  No tracheal deviation and normal range of motion present  Cardiovascular: Normal rate, regular rhythm, normal heart sounds, intact distal pulses and normal pulses  Pulses:       Radial pulses are 2+ on the right side, and 2+ on the left side  Dorsalis pedis pulses are 2+ on the right side, and 2+ on the left side  Posterior tibial pulses are 2+ on the right side, and 2+ on the left side  Pulmonary/Chest: Effort normal and breath sounds normal  No stridor  She has no decreased breath sounds  She has no wheezes  She has no rhonchi  She has no rales  She exhibits no tenderness, no bony tenderness and no crepitus  Abdominal: Soft  Bowel sounds are normal  She exhibits no distension  There is no tenderness  There is no rigidity, no rebound, no guarding and no CVA tenderness  Musculoskeletal: Normal range of motion  Passive ROM intact  Upper and lower extremity 5/5 bilaterally  Neurovascularly intact  No grinding or clicking of joints   Lymphadenopathy:        Head (right side): No submental, no submandibular, no tonsillar, no preauricular, no posterior auricular and no occipital adenopathy present  Head (left side): No submental, no submandibular, no tonsillar, no preauricular, no posterior auricular and no occipital adenopathy present  She has no cervical adenopathy  Right cervical: No superficial cervical, no deep cervical and no posterior cervical adenopathy present  Left cervical: No superficial cervical, no deep cervical and no posterior cervical adenopathy present     Neurological: She is alert and oriented to person, place, and time  She has normal strength and normal reflexes  No sensory deficit  GCS eye subscore is 4  GCS verbal subscore is 5  GCS motor subscore is 6  Reflex Scores:       Patellar reflexes are 2+ on the right side and 2+ on the left side  Skin: Skin is warm and intact  Capillary refill takes less than 2 seconds  She is not diaphoretic  Psychiatric: She has a normal mood and affect  Her speech is normal and behavior is normal  Judgment and thought content normal  Cognition and memory are normal    Nursing note and vitals reviewed  Vital Signs  ED Triage Vitals [07/08/19 0530]   Temperature Pulse Respirations Blood Pressure SpO2   98 6 °F (37 °C) 65 16 (!) 186/90 96 %      Temp Source Heart Rate Source Patient Position - Orthostatic VS BP Location FiO2 (%)   Oral Monitor Sitting Right arm --      Pain Score       Worst Possible Pain           Vitals:    07/08/19 0530 07/08/19 0702   BP: (!) 186/90 170/85   Pulse: 65 62   Patient Position - Orthostatic VS: Sitting Sitting         Visual Acuity      ED Medications  Medications   lidocaine (LIDODERM) 5 % patch 1 patch (1 patch Topical Medication Applied 7/8/19 0554)   ketorolac (TORADOL) injection 15 mg (15 mg Intramuscular Given 7/8/19 0554)   acetaminophen (TYLENOL) tablet 650 mg (650 mg Oral Given 7/8/19 0555)       Diagnostic Studies  Results Reviewed     None                 No orders to display              Procedures  Procedures       ED Course  ED Course as of Jul 08 0716 Mon Jul 08, 2019   1589 Patient with verbalization of decrease in back pain symptomatology status post medication delivery    Will not delivered prednisone due to patient current diagnosis of diabetes mellitus; patient with recent prednisone use "it drove my sugars up to 300 and had to address my insulin "                                  [facility]     Patient is a 51-year-old female history of knee arthroplasty, hip arthroplasty, asthma, neck surgery, diabetes mellitus, hyperlipidemia, and hypertension that presents emergency department with gradual onset dull and achy left lower lumbar pain with radiation to left foot for 6 hours  Patient had similar physical exam compared to when I examined her on 07/01/2019  Patient denies bowel incontinence and saddle paresthesias with cauda equina not likely  Patient with recent CT scan on 07/01/2019 CT lumbar spine without contrast denoted no acute osseous abnormality with broad disc bulge at L3-4 causing moderate central spinal stenosis  Patient verbalized not having followed up with Nephrology for exploration 3 5 cm exophytic lesion at the upper pole of left kidney  Delivered 15 mg Toradol, Tylenol 650, Lidoderm patch and ice emergency department; patient verbalized decreased in left lumbar pain symptomatology status post medication and ice delivery  Patient verbalized she will follow up with her orthopedic surgeon this Friday to discuss surgical treatment options  No prescriptions were delivered at this time  Patient verbalizes that she has prescription for naproxen sodium 500mg  Follow-up with Orthopedics  Follow-up with [facility]  Follow-up with PCP  Follow up with emergency department symptoms persist or exacerbate    Disposition  Final diagnoses:   Chronic left-sided low back pain with left-sided sciatica     Time reflects when diagnosis was documented in both MDM as applicable and the Disposition within this note     Time User Action Codes Description Comment    7/8/2019  6:56 AM Ramonita Spatz [P77 [id number],  G89 29] Chronic left-sided low back pain with left-sided sciatica       ED Disposition     ED Disposition Condition Date/Time Comment    Discharge Stable Mon Jul 8, 2019  6:37 AM Susi Meek discharge to home/self care              Follow-up Information     Follow up With Specialties Details Why Contact Info Additional [address], DO Internal Medicine Call in 1 week for further evaluation of symptoms [address] [place] Emergency Department Emergency Medicine Go to  As needed [address] [facility] [id number] [phone] [place] ED, [address], [address] [place] [address] [facility] Physical Therapy Call in 2 days for further evaluation of symptoms [phone]     Alo Bethea MD Orthopedics, Orthopedic Surgery Call in 3 days for further evaluation of symptoms [address] [facility] [address] [phone]             Patient's Medications   Discharge Prescriptions    No medications on file     No discharge procedures on file      ED Provider  Electronically Signed by           Geronimo Kussmaul, PA-C  07/08/19 0299       Geronimo Kussmaul, PA-C  07/08/19 2268

## 2019-07-30 ENCOUNTER — HOSPITAL ENCOUNTER (EMERGENCY)
Facility: HOSPITAL | Age: 54
Discharge: HOME | End: 2019-07-30
Attending: EMERGENCY MEDICINE
Payer: MEDICARE

## 2019-07-30 VITALS
HEIGHT: 67 IN | BODY MASS INDEX: 42.64 KG/M2 | OXYGEN SATURATION: 96 % | SYSTOLIC BLOOD PRESSURE: 124 MMHG | HEART RATE: 111 BPM | DIASTOLIC BLOOD PRESSURE: 72 MMHG | TEMPERATURE: 98.4 F | RESPIRATION RATE: 17 BRPM

## 2019-07-30 DIAGNOSIS — M54.50 ACUTE LOW BACK PAIN: Primary | ICD-10-CM

## 2019-07-30 PROCEDURE — 99283 EMERGENCY DEPT VISIT LOW MDM: CPT

## 2019-07-30 PROCEDURE — 99282 EMERGENCY DEPT VISIT SF MDM: CPT | Performed by: PHYSICIAN ASSISTANT

## 2019-07-30 PROCEDURE — 96372 THER/PROPH/DIAG INJ SC/IM: CPT

## 2019-07-30 RX ORDER — KETOROLAC TROMETHAMINE 30 MG/ML
15 INJECTION, SOLUTION INTRAMUSCULAR; INTRAVENOUS ONCE
Status: COMPLETED | OUTPATIENT
Start: 2019-07-30 | End: 2019-07-30

## 2019-07-30 RX ORDER — LIDOCAINE 50 MG/G
1 PATCH TOPICAL ONCE
Status: DISCONTINUED | OUTPATIENT
Start: 2019-07-30 | End: 2019-07-30 | Stop reason: HOSPADM

## 2019-07-30 RX ADMIN — KETOROLAC TROMETHAMINE 15 MG: 30 INJECTION, SOLUTION INTRAMUSCULAR at 19:04

## 2019-07-30 RX ADMIN — LIDOCAINE 1 PATCH: 50 PATCH TOPICAL at 19:04

## 2019-07-30 NOTE — ED PROVIDER NOTES
History  Chief Complaint   Patient presents with    Back Pain     Patient states she twisted wrong today and already has 3 bulging discs in her lower back and states now the pain is worse  59-year-old female patient here for evaluation of back pain  This is a chronic issue  She has had multiple ER visits recently for the same  She states to 3 days ago she twisted wrong while trying to transfer from wheelchair and since then has had worsening pain  Now shoots up her back  Pain in her lower back shooting into her leg is worse than usual   Denies any saddle anesthesia, urinary incontinence or bowel incontinence  No weakness  No fevers or chills nausea vomiting  She has multiple prior surgeries in the back  She is due to have injections in lower back this Friday with her pain specialist but states she cannot take the pain until then  She takes gabapentin and oxycodone at home  She states typically she gets Radha Arora for breakthrough pain when she has pain like this  History provided by:  Patient   used: No    Back Pain   Location:  Lumbar spine  Quality:  Aching  Radiates to:  Does not radiate  Pain severity:  Severe  Pain is:  Same all the time  Onset quality:  Sudden  Duration:  3 days  Timing:  Constant  Progression:  Worsening  Chronicity: Acute on chronic  Context: twisting    Relieved by:  Nothing  Worsened by:  Twisting and touching  Ineffective treatments: Oxycodone and gabapentin  Associated symptoms: no abdominal pain, no abdominal swelling, no bladder incontinence, no bowel incontinence, no chest pain, no dysuria, no fever, no headaches, no leg pain, no numbness, no paresthesias, no pelvic pain, no perianal numbness, no tingling, no weakness and no weight loss        Prior to Admission Medications   Prescriptions Last Dose Informant Patient Reported? Taking?    ALPRAZolam (XANAX) 2 MG tablet   Yes No   Sig: Take by mouth   acetaminophen (TYLENOL) 325 mg tablet No No   Sig: Take 2 tablets (650 mg total) by mouth every 6 (six) hours as needed for mild pain   Patient not taking: Reported on 7/1/2019   acetaminophen (TYLENOL) 325 mg tablet   No No   Sig: Take 2 tablets (650 mg total) by mouth every 6 (six) hours as needed for mild pain   Patient not taking: Reported on 7/1/2019   cyclobenzaprine (FLEXERIL) 5 mg tablet   No No   Sig: Take 1 tablet (5 mg total) by mouth 3 (three) times a day as needed for muscle spasms for up to 30 doses   Patient not taking: Reported on 7/1/2019   ibuprofen (MOTRIN) 400 mg tablet   No No   Sig: Take 1 tablet (400 mg total) by mouth every 6 (six) hours as needed for mild pain for up to 5 days   insulin aspart (NovoLOG) 100 Units/mL SOPN   Yes No   Sig: Inject under the skin    insulin detemir (LEVEMIR) 100 units/mL subcutaneous injection   Yes No   Sig: Inject 45 Units under the skin 2 (two) times a day    lidocaine (LIDODERM) 5 %   No No   Sig: Apply 1 patch topically daily Remove & Discard patch within 12 hours or as directed by MD   Patient not taking: Reported on 7/8/2019   lisinopril (ZESTRIL) 20 mg tablet   Yes No   Sig: Take 1 tablet by mouth daily   metFORMIN (GLUCOPHAGE) 1000 MG tablet   Yes No   Sig: Take by mouth   methocarbamol (ROBAXIN) 750 mg tablet   No No   Sig: Take 1 tablet (750 mg total) by mouth 4 (four) times a day as needed for muscle spasms for up to 7 days   Patient not taking: Reported on 7/1/2019   naproxen (NAPROSYN) 500 mg tablet   No No   Sig: Take 1 tablet (500 mg total) by mouth 2 (two) times a day with meals for 7 days   oxyCODONE (ROXICODONE) 30 MG immediate release tablet   Yes No   Sig: Take by mouth      Facility-Administered Medications: None       Past Medical History:   Diagnosis Date    Asthma     Diabetes mellitus ([address] )     Disease of thyroid gland     Hyperlipidemia     Hypertension     Migraine     Psychiatric disorder     anxiety, depression       Past Surgical History:   Procedure Laterality Date    HIP ARTHROPLASTY      KNEE ARTHROPLASTY      NECK SURGERY      SHOULDER ARTHROSCOPY         History reviewed  No pertinent family history  I have reviewed and agree with the history as documented  Social History     Tobacco Use    Smoking status: Former Smoker    Smokeless tobacco: Never Used   Substance Use Topics    Alcohol use: No     Frequency: Never    Drug use: No        Review of Systems   Constitutional: Negative for activity change, appetite change, chills, diaphoresis, fatigue, fever, unexpected weight change and weight loss  HENT: Negative for congestion, rhinorrhea, sinus pressure, sore throat and trouble swallowing  Eyes: Negative for photophobia and visual disturbance  Respiratory: Negative for apnea, cough, choking, chest tightness, shortness of breath, wheezing and stridor  Cardiovascular: Negative for chest pain, palpitations and leg swelling  Gastrointestinal: Negative for abdominal distention, abdominal pain, blood in stool, bowel incontinence, constipation, diarrhea, nausea and vomiting  Genitourinary: Negative for bladder incontinence, decreased urine volume, difficulty urinating, dysuria, enuresis, flank pain, frequency, hematuria, pelvic pain and urgency  Musculoskeletal: Positive for back pain  Negative for arthralgias, myalgias, neck pain and neck stiffness  Skin: Negative for color change, pallor, rash and wound  Allergic/Immunologic: Negative  Neurological: Negative for dizziness, tingling, tremors, syncope, weakness, light-headedness, numbness, headaches and paresthesias  Hematological: Negative  Psychiatric/Behavioral: Negative  All other systems reviewed and are negative  Physical Exam  Physical Exam   Constitutional: She is oriented to person, place, and time  She appears well-developed and well-nourished  Non-toxic appearance  She does not have a sickly appearance  She does not appear ill  No distress     HENT:   Head: Normocephalic and atraumatic  Eyes: Pupils are equal, round, and reactive to light  EOM and lids are normal    Neck: Normal range of motion  Neck supple  Cardiovascular: Normal rate, regular rhythm, S1 normal, S2 normal, normal heart sounds, intact distal pulses and normal pulses  Exam reveals no gallop, no distant heart sounds, no friction rub and no decreased pulses  No murmur heard  Pulses:       Radial pulses are 2+ on the right side, and 2+ on the left side  Pulmonary/Chest: Effort normal and breath sounds normal  No accessory muscle usage  No apnea, no tachypnea and no bradypnea  No respiratory distress  She has no decreased breath sounds  She has no wheezes  She has no rhonchi  She has no rales  Abdominal: Soft  Normal appearance  She exhibits no distension  There is no tenderness  There is no rigidity, no rebound and no guarding  Musculoskeletal: Normal range of motion  She exhibits no edema or deformity  Cervical back: Normal         Thoracic back: She exhibits tenderness, bony tenderness and pain  She exhibits normal range of motion, no swelling, no edema, no deformity and no laceration  Lumbar back: She exhibits tenderness, bony tenderness and pain  She exhibits normal range of motion, no swelling, no edema, no deformity and no laceration  Neurological: She is alert and oriented to person, place, and time  No cranial nerve deficit  GCS eye subscore is 4  GCS verbal subscore is 5  GCS motor subscore is 6  Skin: Skin is warm, dry and intact  No rash noted  She is not diaphoretic  No erythema  No pallor  Psychiatric: Her speech is normal    Nursing note and vitals reviewed        Vital Signs  ED Triage Vitals [07/30/19 1743]   Temperature Pulse Respirations Blood Pressure SpO2   98 4 °F (36 9 °C) (!) 111 17 124/72 96 %      Temp Source Heart Rate Source Patient Position - Orthostatic VS BP Location FiO2 (%)   Oral Monitor Sitting Left arm --      Pain Score       Worst Possible Pain           Vitals:    19 1743   BP: 124/72   Pulse: (!) 111   Patient Position - Orthostatic VS: Sitting         Visual Acuity      ED Medications  Medications   lidocaine (LIDODERM) 5 % patch 1 patch (1 patch Topical Medication Applied 19)   ketorolac (TORADOL) injection 15 mg (15 mg Intramuscular Given 19)       Diagnostic Studies  Results Reviewed     None                 No orders to display              Procedures  Procedures       ED Course                               MDM  Number of Diagnoses or Management Options  Acute low back pain: new and requires workup  Diagnosis management comments: DDx including but not limited to: sciatica, herniated disc, arthritis, spinal stenosis, strain, sprain, doubt fracture, cauda equina syndrome, epidural abscess    Risk of Complications, Morbidity, and/or Mortality  Presenting problems: low  Management options: low  General comments: Plan/medical decision makin-year-old female with back pain  No red flag signs on exam   This is a chronic issue for her  She is due to see her pain management specialist this Friday  She is requesting additional Percocet for breakthrough pain  Explained that it becomes dangerous having multiple prescriptions for narcotics  She just had 180 tabs of oxycodone filled 10 days ago  Recommend she contact her pain management for earlier appointment and if unable to get in until Friday at least recommendations for pain until then  Continue with conservative management as well  Toradol here  Lidocaine patch  Return parameters provided  Pt understands and agrees with plan        Patient Progress  Patient progress: stable      Disposition  Final diagnoses:   Acute low back pain     Time reflects when diagnosis was documented in both MDM as applicable and the Disposition within this note     Time User Action Codes Description Comment    2019  6:57 PM Yamel MCWILLIAMS Add [M54 5] Acute low back pain       ED Disposition     ED Disposition Condition Date/Time Comment    Discharge Stable Tue Jul 30, 2019  6:57 PM Eron Vann discharge to home/self care  Follow-up Information     Follow up With Specialties Details Why Contact Info    Pain management              Patient's Medications   Discharge Prescriptions    No medications on file     No discharge procedures on file      ED Provider  Electronically Signed by           Lary Gallardo PA-C  07/30/19 9920

## 2019-07-30 NOTE — DISCHARGE INSTRUCTIONS
Return to the Emergency Department sooner if numbness, weakness, fever, vomiting, diarrhea, incontinence, difficulty walking or breathing or urinating, rash

## 2019-08-03 ENCOUNTER — HOSPITAL ENCOUNTER (EMERGENCY)
Facility: HOSPITAL | Age: 54
Discharge: HOME | End: 2019-08-03
Attending: EMERGENCY MEDICINE | Admitting: EMERGENCY MEDICINE
Payer: MEDICARE

## 2019-08-03 VITALS
SYSTOLIC BLOOD PRESSURE: 155 MMHG | DIASTOLIC BLOOD PRESSURE: 79 MMHG | TEMPERATURE: 98.3 F | WEIGHT: 266.54 LBS | HEART RATE: 65 BPM | RESPIRATION RATE: 18 BRPM | OXYGEN SATURATION: 97 % | BODY MASS INDEX: 41.83 KG/M2 | HEIGHT: 67 IN

## 2019-08-03 DIAGNOSIS — G89.29 CHRONIC PAIN: ICD-10-CM

## 2019-08-03 DIAGNOSIS — R19.7 NAUSEA VOMITING AND DIARRHEA: Primary | ICD-10-CM

## 2019-08-03 DIAGNOSIS — R11.2 NAUSEA VOMITING AND DIARRHEA: Primary | ICD-10-CM

## 2019-08-03 DIAGNOSIS — Z76.5 DRUG-SEEKING BEHAVIOR: ICD-10-CM

## 2019-08-03 LAB
ALBUMIN SERPL BCP-MCNC: 3.3 G/DL (ref 3.5–5)
ALP SERPL-CCNC: 116 U/L (ref 46–116)
ALT SERPL W P-5'-P-CCNC: 26 U/L (ref 12–78)
ANION GAP SERPL CALCULATED.3IONS-SCNC: 10 MMOL/L (ref 4–13)
AST SERPL W P-5'-P-CCNC: 20 U/L (ref 5–45)
BASOPHILS # BLD AUTO: 0.08 THOUSANDS/ΜL (ref 0–0.1)
BASOPHILS NFR BLD AUTO: 1 % (ref 0–1)
BILIRUB SERPL-MCNC: 1.3 MG/DL (ref 0.2–1)
BUN SERPL-MCNC: 16 MG/DL (ref 5–25)
CALCIUM SERPL-MCNC: 9.3 MG/DL (ref 8.3–10.1)
CHLORIDE SERPL-SCNC: 105 MMOL/L (ref 100–108)
CO2 SERPL-SCNC: 27 MMOL/L (ref 21–32)
CREAT SERPL-MCNC: 0.82 MG/DL (ref 0.6–1.3)
EOSINOPHIL # BLD AUTO: 0.07 THOUSAND/ΜL (ref 0–0.61)
EOSINOPHIL NFR BLD AUTO: 1 % (ref 0–6)
ERYTHROCYTE [DISTWIDTH] IN BLOOD BY AUTOMATED COUNT: 13.2 % (ref 11.6–15.1)
GFR SERPL CREATININE-BSD FRML MDRD: 81 ML/MIN/1.73SQ M
GLUCOSE SERPL-MCNC: 111 MG/DL (ref 65–140)
HCT VFR BLD AUTO: 45.2 % (ref 34.8–46.1)
HGB BLD-MCNC: 15.1 G/DL (ref 11.5–15.4)
IMM GRANULOCYTES # BLD AUTO: 0.02 THOUSAND/UL (ref 0–0.2)
IMM GRANULOCYTES NFR BLD AUTO: 0 % (ref 0–2)
LIPASE SERPL-CCNC: 181 U/L (ref 73–393)
LYMPHOCYTES # BLD AUTO: 2.67 THOUSANDS/ΜL (ref 0.6–4.47)
LYMPHOCYTES NFR BLD AUTO: 29 % (ref 14–44)
MCH RBC QN AUTO: 28.2 PG (ref 26.8–34.3)
MCHC RBC AUTO-ENTMCNC: 33.4 G/DL (ref 31.4–37.4)
MCV RBC AUTO: 85 FL (ref 82–98)
MONOCYTES # BLD AUTO: 0.56 THOUSAND/ΜL (ref 0.17–1.22)
MONOCYTES NFR BLD AUTO: 6 % (ref 4–12)
NEUTROPHILS # BLD AUTO: 5.69 THOUSANDS/ΜL (ref 1.85–7.62)
NEUTS SEG NFR BLD AUTO: 63 % (ref 43–75)
NRBC BLD AUTO-RTO: 0 /100 WBCS
PLATELET # BLD AUTO: 131 THOUSANDS/UL (ref 149–390)
PMV BLD AUTO: 11.4 FL (ref 8.9–12.7)
POTASSIUM SERPL-SCNC: 3.8 MMOL/L (ref 3.5–5.3)
PROT SERPL-MCNC: 7.4 G/DL (ref 6.4–8.2)
RBC # BLD AUTO: 5.35 MILLION/UL (ref 3.81–5.12)
SODIUM SERPL-SCNC: 142 MMOL/L (ref 136–145)
WBC # BLD AUTO: 9.09 THOUSAND/UL (ref 4.31–10.16)

## 2019-08-03 PROCEDURE — 36415 COLL VENOUS BLD VENIPUNCTURE: CPT | Performed by: EMERGENCY MEDICINE

## 2019-08-03 PROCEDURE — 96360 HYDRATION IV INFUSION INIT: CPT

## 2019-08-03 PROCEDURE — 85025 COMPLETE CBC W/AUTO DIFF WBC: CPT | Performed by: EMERGENCY MEDICINE

## 2019-08-03 PROCEDURE — 83690 ASSAY OF LIPASE: CPT | Performed by: EMERGENCY MEDICINE

## 2019-08-03 PROCEDURE — 96361 HYDRATE IV INFUSION ADD-ON: CPT

## 2019-08-03 PROCEDURE — 99284 EMERGENCY DEPT VISIT MOD MDM: CPT

## 2019-08-03 PROCEDURE — 80053 COMPREHEN METABOLIC PANEL: CPT | Performed by: EMERGENCY MEDICINE

## 2019-08-03 PROCEDURE — 99283 EMERGENCY DEPT VISIT LOW MDM: CPT | Performed by: EMERGENCY MEDICINE

## 2019-08-03 RX ORDER — ONDANSETRON 4 MG/1
4 TABLET, ORALLY DISINTEGRATING ORAL EVERY 8 HOURS PRN
Qty: 12 TABLET | Refills: 0 | Status: SHIPPED | OUTPATIENT
Start: 2019-08-03 | End: 2020-02-17

## 2019-08-03 RX ORDER — MORPHINE SULFATE 4 MG/ML
4 INJECTION, SOLUTION INTRAMUSCULAR; INTRAVENOUS ONCE
Status: DISCONTINUED | OUTPATIENT
Start: 2019-08-03 | End: 2019-08-03

## 2019-08-03 RX ORDER — GABAPENTIN 300 MG/1
300 CAPSULE ORAL
Refills: 2 | Status: ON HOLD | COMMUNITY
Start: 2019-07-02 | End: 2020-02-17 | Stop reason: ALTCHOICE

## 2019-08-03 RX ORDER — KETOROLAC TROMETHAMINE 30 MG/ML
15 INJECTION, SOLUTION INTRAMUSCULAR; INTRAVENOUS ONCE
Status: DISCONTINUED | OUTPATIENT
Start: 2019-08-03 | End: 2019-08-03

## 2019-08-03 RX ORDER — ALBUTEROL SULFATE 90 UG/1
AEROSOL, METERED RESPIRATORY (INHALATION)
COMMUNITY
Start: 2015-08-06

## 2019-08-03 RX ORDER — ONDANSETRON 4 MG/1
8 TABLET, ORALLY DISINTEGRATING ORAL ONCE
Status: COMPLETED | OUTPATIENT
Start: 2019-08-03 | End: 2019-08-03

## 2019-08-03 RX ORDER — BLOOD-GLUCOSE METER
EACH MISCELLANEOUS
COMMUNITY

## 2019-08-03 RX ORDER — OXYCODONE HYDROCHLORIDE AND ACETAMINOPHEN 5; 325 MG/1; MG/1
2 TABLET ORAL ONCE
Status: COMPLETED | OUTPATIENT
Start: 2019-08-03 | End: 2019-08-03

## 2019-08-03 RX ORDER — BLOOD SUGAR DIAGNOSTIC
STRIP MISCELLANEOUS
Refills: 0 | COMMUNITY
Start: 2019-05-22

## 2019-08-03 RX ORDER — WHEELCHAIR
EACH MISCELLANEOUS
COMMUNITY
Start: 2015-02-24

## 2019-08-03 RX ADMIN — SODIUM CHLORIDE 1000 ML: 0.9 INJECTION, SOLUTION INTRAVENOUS at 21:57

## 2019-08-03 RX ADMIN — ONDANSETRON 8 MG: 4 TABLET, ORALLY DISINTEGRATING ORAL at 21:44

## 2019-08-03 RX ADMIN — OXYCODONE HYDROCHLORIDE AND ACETAMINOPHEN 2 TABLET: 5; 325 TABLET ORAL at 21:44
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
## 2019-08-04 NOTE — ED NOTES
Pt is refusing IV and blood work, provider made aware   Medications switched to PO medications     Chad Ramos RN  08/03/19 5090

## 2019-08-04 NOTE — ED NOTES
Pt spoke with Dr Hugh Houston regarding refusing IV and bloodwork, pt agreed to IV and blood work but still requested PO pain medication     Suresh Fitzpatrick RN  08/03/19 9994

## 2019-12-30 ENCOUNTER — HOSPITAL ENCOUNTER (OUTPATIENT)
Facility: HOSPITAL | Age: 54
Setting detail: SURGERY ADMIT
End: 2019-12-30
Attending: ORTHOPAEDIC SURGERY | Admitting: ORTHOPAEDIC SURGERY
Payer: MEDICARE

## 2019-12-30 DIAGNOSIS — Z01.818 PREOP TESTING: ICD-10-CM

## 2020-01-10 ENCOUNTER — APPOINTMENT (OUTPATIENT)
Dept: PREADMISSION TESTING | Facility: HOSPITAL | Age: 55
End: 2020-01-10
Payer: MEDICARE

## 2020-01-10 ENCOUNTER — HOSPITAL ENCOUNTER (OUTPATIENT)
Dept: RADIOLOGY | Facility: HOSPITAL | Age: 55
Discharge: HOME | End: 2020-01-10
Payer: MEDICARE

## 2020-01-10 VITALS — WEIGHT: 212 LBS | BODY MASS INDEX: 33.2 KG/M2

## 2020-01-10 DIAGNOSIS — Z01.818 PREOP TESTING: Primary | ICD-10-CM

## 2020-01-10 DIAGNOSIS — Z01.818 PREOP TESTING: ICD-10-CM

## 2020-01-10 LAB
ABO GROUP BLD: NORMAL
ALBUMIN SERPL BCP-MCNC: 3.4 G/DL (ref 3–5.2)
ALP SERPL-CCNC: 132 U/L (ref 43–122)
ALT SERPL W P-5'-P-CCNC: 34 U/L (ref 9–52)
ANION GAP SERPL CALCULATED.3IONS-SCNC: 7 MMOL/L (ref 5–14)
APTT PPP: 28 SECONDS (ref 25–32)
AST SERPL W P-5'-P-CCNC: 19 U/L (ref 14–36)
BASOPHILS # BLD AUTO: 0 THOUSANDS/ΜL (ref 0–0.1)
BASOPHILS NFR BLD AUTO: 1 % (ref 0–1)
BILIRUB SERPL-MCNC: 1.4 MG/DL
BLD GP AB SCN SERPL QL: NEGATIVE
BUN SERPL-MCNC: 17 MG/DL (ref 5–25)
CALCIUM SERPL-MCNC: 9.1 MG/DL (ref 8.4–10.2)
CHLORIDE SERPL-SCNC: 106 MMOL/L (ref 97–108)
CO2 SERPL-SCNC: 27 MMOL/L (ref 22–30)
CREAT SERPL-MCNC: 0.77 MG/DL (ref 0.6–1.2)
EOSINOPHIL # BLD AUTO: 0.1 THOUSAND/ΜL (ref 0–0.4)
EOSINOPHIL NFR BLD AUTO: 1 % (ref 0–6)
ERYTHROCYTE [DISTWIDTH] IN BLOOD BY AUTOMATED COUNT: 15 %
GFR SERPL CREATININE-BSD FRML MDRD: 88 ML/MIN/1.73SQ M
GLUCOSE P FAST SERPL-MCNC: 189 MG/DL (ref 70–99)
GLUCOSE SERPL-MCNC: 189 MG/DL (ref 70–99)
HCT VFR BLD AUTO: 44.3 % (ref 36–46)
HGB BLD-MCNC: 14.3 G/DL (ref 12–16)
INR PPP: 0.89 (ref 0.91–1.09)
LYMPHOCYTES # BLD AUTO: 1.7 THOUSANDS/ΜL (ref 0.5–4)
LYMPHOCYTES NFR BLD AUTO: 29 % (ref 25–45)
MCH RBC QN AUTO: 27.9 PG (ref 26–34)
MCHC RBC AUTO-ENTMCNC: 32.4 G/DL (ref 31–36)
MCV RBC AUTO: 86 FL (ref 80–100)
MONOCYTES # BLD AUTO: 0.4 THOUSAND/ΜL (ref 0.2–0.9)
MONOCYTES NFR BLD AUTO: 7 % (ref 1–10)
NEUTROPHILS # BLD AUTO: 3.7 THOUSANDS/ΜL (ref 1.8–7.8)
NEUTS SEG NFR BLD AUTO: 62 % (ref 45–65)
PLATELET # BLD AUTO: 118 THOUSANDS/UL (ref 150–450)
PMV BLD AUTO: 9.6 FL (ref 8.9–12.7)
POTASSIUM SERPL-SCNC: 4.4 MMOL/L (ref 3.6–5)
PROT SERPL-MCNC: 6.4 G/DL (ref 5.9–8.4)
PROTHROMBIN TIME: 9.8 SECONDS (ref 9.8–12)
RBC # BLD AUTO: 5.13 MILLION/UL (ref 4–5.2)
RH BLD: POSITIVE
SODIUM SERPL-SCNC: 140 MMOL/L (ref 137–147)
SPECIMEN EXPIRATION DATE: NORMAL
WBC # BLD AUTO: 5.9 THOUSAND/UL (ref 4.5–11)

## 2020-01-10 PROCEDURE — 86901 BLOOD TYPING SEROLOGIC RH(D): CPT

## 2020-01-10 PROCEDURE — 85025 COMPLETE CBC W/AUTO DIFF WBC: CPT

## 2020-01-10 PROCEDURE — 85730 THROMBOPLASTIN TIME PARTIAL: CPT

## 2020-01-10 PROCEDURE — 71046 X-RAY EXAM CHEST 2 VIEWS: CPT

## 2020-01-10 PROCEDURE — 86850 RBC ANTIBODY SCREEN: CPT

## 2020-01-10 PROCEDURE — 85610 PROTHROMBIN TIME: CPT

## 2020-01-10 PROCEDURE — 80053 COMPREHEN METABOLIC PANEL: CPT

## 2020-01-10 PROCEDURE — 86900 BLOOD TYPING SEROLOGIC ABO: CPT

## 2020-01-10 RX ORDER — ATORVASTATIN CALCIUM 20 MG/1
20 TABLET, FILM COATED ORAL DAILY
COMMUNITY

## 2020-01-10 NOTE — PRE-PROCEDURE INSTRUCTIONS
Pre-Surgery Instructions:   Medication Instructions    albuterol (VENTOLIN HFA) 90 mcg/act inhaler Instructed patient per Anesthesia Guidelines   ALPRAZolam (XANAX) 2 MG tablet Instructed patient per Anesthesia Guidelines   lisinopril (ZESTRIL) 20 mg tablet Instructed patient per Anesthesia Guidelines   oxyCODONE (ROXICODONE) 30 MG immediate release tablet Instructed patient per Anesthesia Guidelines  Pre-op Showering Instructions for Surgery Patients    Before your operation, you play an important role in decreasing your risk for infection by washing with special antiseptic soap  This is an effective way to reduce bacteria on the skin which may help to prevent infections at the surgical site  Please read the following directions in advance  1  In the week before your operation, purchase a 4 ounce bottle of antiseptic soap containing chlorhexidine gluconate (CHG)  4%  Some brand names include: Aplicare®, Endure, and Hibiclens®  The cost is usually less than $5 00   For your convenience, the [employer] carries the soap   It may also be available at your doctors office or pre-admission testing center, and at most retail pharmacies   If you are allergic or sensitive to soaps containing CHG, please let your doctor know so another antiseptic can be suggested   CHG antiseptic soap is for external use only  2   The day before your operation, follow these instructions carefully to get ready   Please clean linens (sheets) on your bed; you should sleep on clean sheets after your evening shower   Get clean towels and washcloth ready - you need enough for 2 showers   Set aside clean underwear, pajamas, and clothing to wear after the showers     Reminders:   DO NOT use any other soap or body rinse on your skin during or after the antiseptic showers   DO NOT use lotion, powder, deodorant, or perfume/aftershave of any kind on your skin after your antiseptic shower   DO NOT shave any body parts in the 24 hours/day before your operation   DO NOT get the antiseptic soap in your eyes, ears, nose, mouth, or vaginal area    3  You will need to shower the night before AND the morning of your surgery  Shower 1:   The first evening before the operation, take the first shower   First, shampoo your hair with regular shampoo and rinse it completely before you use the antiseptic soap  After washing and rinsing your hair, rinse your body   Next, use a clean washcloth to apply the antiseptic soap and wash your body from the neck down to your toes using ½ bottle of the antiseptic soap   You will use the other ½ bottle for the second shower   Clean the area where your incision will be; lather this area well for about 2 minutes   If you are having head or neck surgery, wash areas with the antiseptic soap   Rinse yourself completely with running water   Use a clean towel to dry off   Wear clean underwear and clothing/pajamas  Shower 2   The morning of your operation, take the second shower following the same steps as Shower 1 using the second ½ of the bottle of antiseptic soap   Use clean cloths and towels to wash and dry yourself   Wear clean underwear and clothing

## 2020-01-17 RX ORDER — SODIUM CHLORIDE, SODIUM LACTATE, POTASSIUM CHLORIDE, CALCIUM CHLORIDE 600; 310; 30; 20 MG/100ML; MG/100ML; MG/100ML; MG/100ML
75 INJECTION, SOLUTION INTRAVENOUS CONTINUOUS
Status: CANCELLED | OUTPATIENT
Start: 2020-02-17

## 2020-01-17 RX ORDER — CEFAZOLIN SODIUM 2 G/50ML
2000 SOLUTION INTRAVENOUS ONCE
Status: CANCELLED | OUTPATIENT
Start: 2020-02-17

## 2020-02-14 ENCOUNTER — ANESTHESIA EVENT (OUTPATIENT)
Dept: PERIOP | Facility: HOSPITAL | Age: 55
End: 2020-02-14
Payer: MEDICARE
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
## 2020-02-17 ENCOUNTER — APPOINTMENT (OUTPATIENT)
Dept: RADIOLOGY | Facility: HOSPITAL | Age: 55
End: 2020-02-17
Payer: MEDICARE

## 2020-02-17 ENCOUNTER — ANESTHESIA (OUTPATIENT)
Dept: PERIOP | Facility: HOSPITAL | Age: 55
End: 2020-02-17
Payer: MEDICARE

## 2020-02-17 ENCOUNTER — HOSPITAL ENCOUNTER (OUTPATIENT)
Facility: HOSPITAL | Age: 55
Setting detail: OUTPATIENT SURGERY
Discharge: HOME | End: 2020-02-17
Attending: ORTHOPAEDIC SURGERY | Admitting: ORTHOPAEDIC SURGERY
Payer: MEDICARE

## 2020-02-17 VITALS
WEIGHT: 220 LBS | OXYGEN SATURATION: 98 % | SYSTOLIC BLOOD PRESSURE: 168 MMHG | TEMPERATURE: 97.3 F | RESPIRATION RATE: 20 BRPM | BODY MASS INDEX: 34.53 KG/M2 | HEIGHT: 67 IN | HEART RATE: 97 BPM | DIASTOLIC BLOOD PRESSURE: 81 MMHG

## 2020-02-17 LAB
ABO GROUP BLD: NORMAL
BLD GP AB SCN SERPL QL: NEGATIVE
GLUCOSE SERPL-MCNC: 159 MG/DL (ref 65–140)
GLUCOSE SERPL-MCNC: 182 MG/DL (ref 65–140)
RH BLD: POSITIVE
SPECIMEN EXPIRATION DATE: NORMAL

## 2020-02-17 PROCEDURE — 82948 REAGENT STRIP/BLOOD GLUCOSE: CPT

## 2020-02-17 PROCEDURE — 86900 BLOOD TYPING SEROLOGIC ABO: CPT | Performed by: ORTHOPAEDIC SURGERY

## 2020-02-17 PROCEDURE — 86850 RBC ANTIBODY SCREEN: CPT | Performed by: ORTHOPAEDIC SURGERY

## 2020-02-17 PROCEDURE — 86901 BLOOD TYPING SEROLOGIC RH(D): CPT | Performed by: ORTHOPAEDIC SURGERY

## 2020-02-17 RX ORDER — CEFAZOLIN SODIUM 2 G/50ML
2000 SOLUTION INTRAVENOUS ONCE
Status: DISCONTINUED | OUTPATIENT
Start: 2020-02-17 | End: 2020-02-18 | Stop reason: HOSPADM

## 2020-02-17 RX ORDER — SODIUM CHLORIDE, SODIUM LACTATE, POTASSIUM CHLORIDE, CALCIUM CHLORIDE 600; 310; 30; 20 MG/100ML; MG/100ML; MG/100ML; MG/100ML
75 INJECTION, SOLUTION INTRAVENOUS CONTINUOUS
Status: DISCONTINUED | OUTPATIENT
Start: 2020-02-17 | End: 2020-02-18 | Stop reason: HOSPADM

## 2020-02-17 RX ORDER — LIDOCAINE HYDROCHLORIDE 10 MG/ML
INJECTION, SOLUTION EPIDURAL; INFILTRATION; INTRACAUDAL; PERINEURAL
Status: DISCONTINUED
Start: 2020-02-17 | End: 2020-02-18 | Stop reason: HOSPADM

## 2020-02-17 RX ORDER — SODIUM CHLORIDE, SODIUM LACTATE, POTASSIUM CHLORIDE, CALCIUM CHLORIDE 600; 310; 30; 20 MG/100ML; MG/100ML; MG/100ML; MG/100ML
50 INJECTION, SOLUTION INTRAVENOUS CONTINUOUS
Status: DISCONTINUED | OUTPATIENT
Start: 2020-02-17 | End: 2020-02-18 | Stop reason: HOSPADM

## 2020-02-17 NOTE — NURSING NOTE
Procedure canceled  Pt refusing surgery at this point  Pt spoke to Dr Luiz Mancera and will reschedule  Pt upset that IV attempts were unsuccessful and feels it is an " omen " not to have the surgery today  Pt left via wheelchair with daughter

## 2020-02-17 NOTE — NURSING NOTE
Was asked by anesthesia to evaluated patient for venous access device  She previously had PIV attempts x2 with no success  Patient was visibly upset, but agreeable to the placement of long-dwell IV (midline)  The left arm was prepped and evaluated  Venous access of the left basilic vein was established with the introducer needle  Brisk blood return noted  Upon inserted the introducer wire, the patient complained of pressure  The wire would not advance more than 5cm  The wire and needle were removed  The patient was becoming increasingly upset and aggravated  She stated "That's it  If they cannot get it in three tries, its a sign  I am going home"  Anesthesia was made aware

## 2020-10-18 ENCOUNTER — HOSPITAL ENCOUNTER (EMERGENCY)
Facility: HOSPITAL | Age: 55
Discharge: HOME | End: 2020-10-18
Attending: EMERGENCY MEDICINE | Admitting: EMERGENCY MEDICINE
Payer: MEDICARE

## 2020-10-18 VITALS
HEART RATE: 106 BPM | SYSTOLIC BLOOD PRESSURE: 174 MMHG | DIASTOLIC BLOOD PRESSURE: 81 MMHG | OXYGEN SATURATION: 99 % | TEMPERATURE: 98.2 F | RESPIRATION RATE: 18 BRPM

## 2020-10-18 DIAGNOSIS — M54.2 NECK PAIN: Primary | ICD-10-CM

## 2020-10-18 PROCEDURE — 99283 EMERGENCY DEPT VISIT LOW MDM: CPT

## 2020-10-18 PROCEDURE — 99284 EMERGENCY DEPT VISIT MOD MDM: CPT | Performed by: EMERGENCY MEDICINE

## 2020-10-18 RX ORDER — CYCLOBENZAPRINE HCL 10 MG
10 TABLET ORAL 2 TIMES DAILY PRN
Qty: 20 TABLET | Refills: 0 | Status: SHIPPED | OUTPATIENT
Start: 2020-10-18

## 2020-10-18 RX ORDER — CYCLOBENZAPRINE HCL 10 MG
10 TABLET ORAL ONCE
Status: COMPLETED | OUTPATIENT
Start: 2020-10-18 | End: 2020-10-18

## 2020-10-18 RX ADMIN — CYCLOBENZAPRINE HYDROCHLORIDE 10 MG: 10 TABLET, FILM COATED ORAL at 14:38

## 2021-02-18 ENCOUNTER — TELEPHONE (OUTPATIENT)
Dept: OBGYN CLINIC | Facility: HOSPITAL | Age: 56
End: 2021-02-18

## 2021-02-18 NOTE — TELEPHONE ENCOUNTER
Patient had surgery on both her hips in the recent years  She had 9 hip replacements on her left hip and 3 on her right hip  Her surgeon will not do any more surgeries  Advised patient has to get us her medical records to review  Once doctor reviews, we can determine if we can take her on as a patient and call her for an appt  Patient stated she can't go get her records from [facility]  She said we have recent xrays from @ [facility] ED and that should be sufficient  Advised patient we would need the surgical reports  She said thanks for nothing and hung up on me

## 2021-07-08 ENCOUNTER — APPOINTMENT (EMERGENCY)
Dept: CT IMAGING | Facility: HOSPITAL | Age: 56
End: 2021-07-08
Payer: MEDICARE

## 2021-07-08 ENCOUNTER — HOSPITAL ENCOUNTER (OUTPATIENT)
Facility: HOSPITAL | Age: 56
Setting detail: OBSERVATION
Discharge: HOME | End: 2021-07-09
Attending: EMERGENCY MEDICINE | Admitting: INTERNAL MEDICINE
Payer: MEDICARE

## 2021-07-08 DIAGNOSIS — R42 DIZZINESS: Primary | ICD-10-CM

## 2021-07-08 PROBLEM — I25.10 ATHEROSCLEROSIS OF CORONARY ARTERY: Status: ACTIVE | Noted: 2021-01-15

## 2021-07-08 PROBLEM — J45.909 ASTHMA: Status: ACTIVE | Noted: 2020-10-10

## 2021-07-08 PROBLEM — M51.36 DEGENERATION OF INTERVERTEBRAL DISC OF LUMBAR REGION: Status: ACTIVE | Noted: 2020-08-25

## 2021-07-08 PROBLEM — M51.369 DEGENERATION OF INTERVERTEBRAL DISC OF LUMBAR REGION: Status: ACTIVE | Noted: 2020-08-25

## 2021-07-08 PROBLEM — E11.49 TYPE II DIABETES MELLITUS WITH NEUROLOGICAL MANIFESTATIONS (HCC): Status: ACTIVE | Noted: 2020-10-10

## 2021-07-08 LAB
ALBUMIN SERPL BCP-MCNC: 3.2 G/DL (ref 3.5–5)
ALP SERPL-CCNC: 135 U/L (ref 46–116)
ALT SERPL W P-5'-P-CCNC: 22 U/L (ref 12–78)
ANION GAP SERPL CALCULATED.3IONS-SCNC: 10 MMOL/L (ref 4–13)
AST SERPL W P-5'-P-CCNC: 23 U/L (ref 5–45)
ATRIAL RATE: 86 BPM
BASOPHILS # BLD AUTO: 0.04 THOUSANDS/ΜL (ref 0–0.1)
BASOPHILS NFR BLD AUTO: 1 % (ref 0–1)
BILIRUB SERPL-MCNC: 1.31 MG/DL (ref 0.2–1)
BUN SERPL-MCNC: 16 MG/DL (ref 5–25)
CALCIUM ALBUM COR SERPL-MCNC: 9.8 MG/DL (ref 8.3–10.1)
CALCIUM SERPL-MCNC: 9.2 MG/DL (ref 8.3–10.1)
CHLORIDE SERPL-SCNC: 106 MMOL/L (ref 100–108)
CO2 SERPL-SCNC: 25 MMOL/L (ref 21–32)
CREAT SERPL-MCNC: 0.85 MG/DL (ref 0.6–1.3)
EOSINOPHIL # BLD AUTO: 0.05 THOUSAND/ΜL (ref 0–0.61)
EOSINOPHIL NFR BLD AUTO: 1 % (ref 0–6)
ERYTHROCYTE [DISTWIDTH] IN BLOOD BY AUTOMATED COUNT: 13.9 % (ref 11.6–15.1)
GFR SERPL CREATININE-BSD FRML MDRD: 77 ML/MIN/1.73SQ M
GLUCOSE SERPL-MCNC: 210 MG/DL (ref 65–140)
GLUCOSE SERPL-MCNC: 222 MG/DL (ref 65–140)
GLUCOSE SERPL-MCNC: 250 MG/DL (ref 65–140)
HCT VFR BLD AUTO: 43.1 % (ref 34.8–46.1)
HGB BLD-MCNC: 13.7 G/DL (ref 11.5–15.4)
IMM GRANULOCYTES # BLD AUTO: 0.01 THOUSAND/UL (ref 0–0.2)
IMM GRANULOCYTES NFR BLD AUTO: 0 % (ref 0–2)
LYMPHOCYTES # BLD AUTO: 1.36 THOUSANDS/ΜL (ref 0.6–4.47)
LYMPHOCYTES NFR BLD AUTO: 34 % (ref 14–44)
MAGNESIUM SERPL-MCNC: 2 MG/DL (ref 1.6–2.6)
MCH RBC QN AUTO: 26.8 PG (ref 26.8–34.3)
MCHC RBC AUTO-ENTMCNC: 31.8 G/DL (ref 31.4–37.4)
MCV RBC AUTO: 84 FL (ref 82–98)
MONOCYTES # BLD AUTO: 0.29 THOUSAND/ΜL (ref 0.17–1.22)
MONOCYTES NFR BLD AUTO: 7 % (ref 4–12)
NEUTROPHILS # BLD AUTO: 2.22 THOUSANDS/ΜL (ref 1.85–7.62)
NEUTS SEG NFR BLD AUTO: 57 % (ref 43–75)
NRBC BLD AUTO-RTO: 0 /100 WBCS
NT-PROBNP SERPL-MCNC: 68 PG/ML
P AXIS: 38 DEGREES
PLATELET # BLD AUTO: 81 THOUSANDS/UL (ref 149–390)
PMV BLD AUTO: 11.4 FL (ref 8.9–12.7)
POTASSIUM SERPL-SCNC: 3.9 MMOL/L (ref 3.5–5.3)
PR INTERVAL: 226 MS
PROT SERPL-MCNC: 7.2 G/DL (ref 6.4–8.2)
QRS AXIS: 6 DEGREES
QRSD INTERVAL: 94 MS
QT INTERVAL: 352 MS
QTC INTERVAL: 421 MS
RBC # BLD AUTO: 5.11 MILLION/UL (ref 3.81–5.12)
SODIUM SERPL-SCNC: 141 MMOL/L (ref 136–145)
T WAVE AXIS: 0 DEGREES
TROPONIN I SERPL-MCNC: <0.02 NG/ML
VENTRICULAR RATE: 86 BPM
WBC # BLD AUTO: 3.97 THOUSAND/UL (ref 4.31–10.16)

## 2021-07-08 PROCEDURE — 84484 ASSAY OF TROPONIN QUANT: CPT | Performed by: EMERGENCY MEDICINE

## 2021-07-08 PROCEDURE — 93005 ELECTROCARDIOGRAM TRACING: CPT

## 2021-07-08 PROCEDURE — 82948 REAGENT STRIP/BLOOD GLUCOSE: CPT

## 2021-07-08 PROCEDURE — 83735 ASSAY OF MAGNESIUM: CPT | Performed by: EMERGENCY MEDICINE

## 2021-07-08 PROCEDURE — 96361 HYDRATE IV INFUSION ADD-ON: CPT

## 2021-07-08 PROCEDURE — 80053 COMPREHEN METABOLIC PANEL: CPT | Performed by: EMERGENCY MEDICINE

## 2021-07-08 PROCEDURE — 83036 HEMOGLOBIN GLYCOSYLATED A1C: CPT | Performed by: INTERNAL MEDICINE

## 2021-07-08 PROCEDURE — 70496 CT ANGIOGRAPHY HEAD: CPT

## 2021-07-08 PROCEDURE — 93010 ELECTROCARDIOGRAM REPORT: CPT | Performed by: INTERNAL MEDICINE

## 2021-07-08 PROCEDURE — 99285 EMERGENCY DEPT VISIT HI MDM: CPT | Performed by: EMERGENCY MEDICINE

## 2021-07-08 PROCEDURE — 99285 EMERGENCY DEPT VISIT HI MDM: CPT

## 2021-07-08 PROCEDURE — 85025 COMPLETE CBC W/AUTO DIFF WBC: CPT | Performed by: EMERGENCY MEDICINE

## 2021-07-08 PROCEDURE — 99220 PR INITIAL OBSERVATION CARE/DAY 70 MINUTES: CPT | Performed by: INTERNAL MEDICINE

## 2021-07-08 PROCEDURE — 71275 CT ANGIOGRAPHY CHEST: CPT

## 2021-07-08 PROCEDURE — 96374 THER/PROPH/DIAG INJ IV PUSH: CPT

## 2021-07-08 PROCEDURE — 70498 CT ANGIOGRAPHY NECK: CPT

## 2021-07-08 PROCEDURE — 36415 COLL VENOUS BLD VENIPUNCTURE: CPT | Performed by: EMERGENCY MEDICINE

## 2021-07-08 PROCEDURE — 83880 ASSAY OF NATRIURETIC PEPTIDE: CPT | Performed by: EMERGENCY MEDICINE

## 2021-07-08 RX ORDER — ALBUTEROL SULFATE 90 UG/1
1 AEROSOL, METERED RESPIRATORY (INHALATION) EVERY 4 HOURS PRN
Status: DISCONTINUED | OUTPATIENT
Start: 2021-07-08 | End: 2021-07-09 | Stop reason: HOSPADM

## 2021-07-08 RX ORDER — ATORVASTATIN CALCIUM 40 MG/1
40 TABLET, FILM COATED ORAL
Status: DISCONTINUED | OUTPATIENT
Start: 2021-07-08 | End: 2021-07-09 | Stop reason: HOSPADM

## 2021-07-08 RX ORDER — ASPIRIN 81 MG/1
81 TABLET, CHEWABLE ORAL DAILY
Status: DISCONTINUED | OUTPATIENT
Start: 2021-07-08 | End: 2021-07-09 | Stop reason: HOSPADM

## 2021-07-08 RX ORDER — DIAZEPAM 5 MG/1
5 TABLET ORAL ONCE
Status: COMPLETED | OUTPATIENT
Start: 2021-07-08 | End: 2021-07-08

## 2021-07-08 RX ORDER — ALPRAZOLAM 0.5 MG/1
0.5 TABLET ORAL 3 TIMES DAILY PRN
Status: DISCONTINUED | OUTPATIENT
Start: 2021-07-08 | End: 2021-07-09 | Stop reason: HOSPADM

## 2021-07-08 RX ORDER — ASPIRIN 81 MG/1
81 TABLET, CHEWABLE ORAL DAILY
COMMUNITY

## 2021-07-08 RX ORDER — ATORVASTATIN CALCIUM 40 MG/1
80 TABLET, FILM COATED ORAL
Status: DISCONTINUED | OUTPATIENT
Start: 2021-07-09 | End: 2021-07-09 | Stop reason: HOSPADM

## 2021-07-08 RX ORDER — METOPROLOL SUCCINATE 25 MG/1
25 TABLET, EXTENDED RELEASE ORAL DAILY
Status: DISCONTINUED | OUTPATIENT
Start: 2021-07-08 | End: 2021-07-09 | Stop reason: HOSPADM

## 2021-07-08 RX ORDER — CLOPIDOGREL BISULFATE 75 MG/1
75 TABLET ORAL DAILY
COMMUNITY

## 2021-07-08 RX ORDER — MECLIZINE HYDROCHLORIDE 25 MG/1
25 TABLET ORAL ONCE
Status: COMPLETED | OUTPATIENT
Start: 2021-07-08 | End: 2021-07-08

## 2021-07-08 RX ORDER — DIAZEPAM 5 MG/1
5 TABLET ORAL EVERY 8 HOURS PRN
COMMUNITY

## 2021-07-08 RX ORDER — CYCLOBENZAPRINE HCL 10 MG
10 TABLET ORAL 2 TIMES DAILY PRN
Status: DISCONTINUED | OUTPATIENT
Start: 2021-07-08 | End: 2021-07-09 | Stop reason: HOSPADM

## 2021-07-08 RX ORDER — CLOPIDOGREL BISULFATE 75 MG/1
75 TABLET ORAL DAILY
Status: DISCONTINUED | OUTPATIENT
Start: 2021-07-08 | End: 2021-07-09 | Stop reason: HOSPADM

## 2021-07-08 RX ORDER — METOPROLOL TARTRATE 5 MG/5ML
5 INJECTION INTRAVENOUS ONCE
Status: COMPLETED | OUTPATIENT
Start: 2021-07-08 | End: 2021-07-08

## 2021-07-08 RX ORDER — DIAPER,BRIEF,INFANT-TODD,DISP
EACH MISCELLANEOUS 4 TIMES DAILY PRN
Status: DISCONTINUED | OUTPATIENT
Start: 2021-07-08 | End: 2021-07-09 | Stop reason: HOSPADM

## 2021-07-08 RX ORDER — METOPROLOL SUCCINATE 25 MG/1
25 TABLET, EXTENDED RELEASE ORAL DAILY
COMMUNITY

## 2021-07-08 RX ORDER — ASPIRIN 81 MG/1
324 TABLET, CHEWABLE ORAL ONCE
Status: COMPLETED | OUTPATIENT
Start: 2021-07-08 | End: 2021-07-08

## 2021-07-08 RX ORDER — MECLIZINE HYDROCHLORIDE 25 MG/1
25 TABLET ORAL EVERY 8 HOURS PRN
Status: DISCONTINUED | OUTPATIENT
Start: 2021-07-08 | End: 2021-07-09 | Stop reason: HOSPADM

## 2021-07-08 RX ORDER — ACETAMINOPHEN 325 MG/1
650 TABLET ORAL EVERY 6 HOURS PRN
Status: DISCONTINUED | OUTPATIENT
Start: 2021-07-08 | End: 2021-07-09 | Stop reason: HOSPADM

## 2021-07-08 RX ORDER — ROSUVASTATIN CALCIUM 40 MG/1
40 TABLET, COATED ORAL DAILY
COMMUNITY

## 2021-07-08 RX ORDER — OXYCODONE HYDROCHLORIDE 10 MG/1
30 TABLET ORAL EVERY 4 HOURS PRN
Status: DISCONTINUED | OUTPATIENT
Start: 2021-07-08 | End: 2021-07-09 | Stop reason: HOSPADM

## 2021-07-08 RX ORDER — ONDANSETRON 2 MG/ML
4 INJECTION INTRAMUSCULAR; INTRAVENOUS EVERY 6 HOURS PRN
Status: DISCONTINUED | OUTPATIENT
Start: 2021-07-08 | End: 2021-07-09 | Stop reason: HOSPADM

## 2021-07-08 RX ADMIN — DIAZEPAM 5 MG: 5 TABLET ORAL at 11:55

## 2021-07-08 RX ADMIN — OXYCODONE HYDROCHLORIDE 30 MG: 10 TABLET ORAL at 18:14

## 2021-07-08 RX ADMIN — IOHEXOL 100 ML: 350 INJECTION, SOLUTION INTRAVENOUS at 10:08

## 2021-07-08 RX ADMIN — SODIUM CHLORIDE 1000 ML: 0.9 INJECTION, SOLUTION INTRAVENOUS at 10:10

## 2021-07-08 RX ADMIN — OXYCODONE HYDROCHLORIDE 30 MG: 10 TABLET ORAL at 23:19

## 2021-07-08 RX ADMIN — MECLIZINE HYDROCHLORIDE 25 MG: 25 TABLET ORAL at 11:16

## 2021-07-08 RX ADMIN — INSULIN DETEMIR 45 UNITS: 100 INJECTION, SOLUTION SUBCUTANEOUS at 21:07

## 2021-07-08 RX ADMIN — METOROPROLOL TARTRATE 5 MG: 5 INJECTION, SOLUTION INTRAVENOUS at 11:55

## 2021-07-08 RX ADMIN — ASPIRIN 81 MG CHEWABLE TABLET 243 MG: 81 TABLET CHEWABLE at 12:01

## 2021-07-08 RX ADMIN — HYDROCORTISONE: 1 OINTMENT TOPICAL at 23:13

## 2021-07-08 NOTE — ED NOTES
Patient requests lunch tray order  Patient was provided a menu but informed that diet order is required before an order can be placed  RN notified       [address]  07/08/21 3768

## 2021-07-08 NOTE — H&P
[address] [facility]  H&P- Leonardo Punch 1965, 54 y o  female MRN: [id number]  Unit/Bed#: -01 Encounter: [id number]  Primary Care Provider: Lenny Tidwell DO   Date and time admitted to hospital: 7/8/2021  8:46 AM    * Dizziness  Assessment & Plan  CTA head and neck  IMPRESSION:  CT brain: No acute intracranial abnormality  CT angiography: No stenosis, occlusion or thrombosis of the cervical or intracranial vasculature        Likely sec to vertigo vs panic attack  Monitor   neurochecks    Type II diabetes mellitus with neurological manifestations [facility])  Assessment & Plan  Lab Results   Component Value Date    HGBA1C 9 3 (H) 01/11/2021       Recent Labs     07/08/21  0904   POCGLU 222*       Blood Sugar Average: Last 72 hrs:  (P) 222     Uncontrolled  Continue insulin    Degeneration of intervertebral disc of lumbar region  Assessment & Plan  Continue home meds    Atherosclerosis of coronary artery  Assessment & Plan  S/p stents  Continue medical management    Asthma  Assessment & Plan  Stable      Anxiety and depression  Assessment & Plan  Continue home meds    Abnormal CT of the chest  CT of the chest questionable for filling defect in 1 subsegmental region pain     Study compromised by movement and body habitus  Denies hypoxia  No respiratory symptoms  Appears artifactual  Will recommend outpatient pulmonology follow-up  Monitor clinically    VTE Prophylaxis: Heparin  / sequential compression device   Code Status:   POLST: POLST form is not discussed and not completed at this time  Discussion with family:   Anticipated Length of Stay:  Patient will be admitted on an Observation basis with an anticipated length of stay of  < 2 midnights  Justification for Hospital Stay:     Total Time for Visit, including Counseling / Coordination of Care: 60 minutes  Greater than 50% of this total time spent on direct patient counseling and coordination of care      Chief Complaint:     History of Present Illness:    Joss Rodriguez is a 54 y o  female with past medical history of type 2 diabetes, hypertension, morbid obesity, primarily wheelchair-bound who presents with dizziness which improved after receiving meclizine  She notes that her symptoms are likely from a panic attack  Review of Systems:    Review of Systems   Constitutional: Negative for activity change and appetite change  Respiratory: Negative for cough, chest tightness and shortness of breath  Gastrointestinal: Negative for abdominal pain, blood in stool and nausea  Genitourinary: Negative for difficulty urinating  Musculoskeletal: Negative for back pain  Neurological: Positive for dizziness  Negative for tremors and speech difficulty  All other systems reviewed and are negative  Past Medical and Surgical History:     Past Medical History:   Diagnosis Date    Anxiety     Arthritis     Asthma     Depression     Diabetes mellitus ([facility] [address] )     Disease of thyroid gland     Hip dislocation, right (HCC)     no hip on the left    History of transfusion     Hyperlipidemia     borderline    Hypertension     Migraine     Psychiatric disorder     anxiety, depression       Past Surgical History:   Procedure Laterality Date    CERVICAL FUSION      X2    FRACTURE SURGERY      plates left leg , rods right leg    HIP ARTHROPLASTY      JOINT REPLACEMENT      9 THR , 3 rev, no left hip , RTKR    KNEE ARTHROPLASTY      NECK SURGERY      SHOULDER ARTHROSCOPY      TOTAL SHOULDER REPLACEMENT Right        Meds/Allergies:    Prior to Admission medications    Medication Sig Start Date End Date Taking?  Authorizing Provider   ACCU-CHEK SAMEER PLUS test strip  5/22/19  Yes Historical Provider, MD   ALPRAZolam Miguel Gomez) 2 MG tablet Take 0 5 mg by mouth 4 (four) times a day as needed  12/17/14  Yes Historical Provider, MD   aspirin 81 mg chewable tablet Chew 81 mg daily   Yes Historical Provider, MD   atorvastatin (LIPITOR) 20 mg tablet Take 20 mg by mouth daily   Yes Historical Provider, MD   Blood Glucose Monitoring Suppl (ONE TOUCH ULTRA 2) w/Device KIT by Does not apply route   Yes Historical Provider, MD   clopidogrel (PLAVIX) 75 mg tablet Take 75 mg by mouth daily   Yes Historical Provider, MD   diazepam (VALIUM) 5 mg tablet Take 5 mg by mouth every 8 (eight) hours as needed for muscle spasms   Yes Historical Provider, MD   glucose blood (ONE TOUCH ULTRA TEST) test strip test up to 4 times daily diag 250 00 2/24/15  Yes Historical Provider, MD   insulin aspart (NovoLOG) 100 Units/mL SOPN Inject under the skin  3/24/15  Yes Historical Provider, MD   insulin detemir (LEVEMIR) 100 units/mL subcutaneous injection Inject 45 Units under the skin 2 (two) times a day    Yes Historical Provider, MD   metoprolol succinate (TOPROL-XL) 25 mg 24 hr tablet Take 25 mg by mouth daily   Yes Historical Provider, MD   Misc   Devices [facility] MISC by Does not apply route 2/24/15  Yes Historical Provider, MD Phil Bolton [address] [facility] by Does not apply route 3/24/15  Yes Historical Provider, MD   oxyCODONE (ROXICODONE) 30 MG immediate release tablet Take 30 mg by mouth every 4 (four) hours as needed  11/11/13  Yes Historical Provider, MD   rosuvastatin (CRESTOR) 40 MG tablet Take 40 mg by mouth daily   Yes Historical Provider, MD   albuterol (VENTOLIN HFA) 90 mcg/act inhaler Two puffs every four hours as needed for wheezing 8/6/15   Historical Provider, MD   cyclobenzaprine (FLEXERIL) 10 mg tablet Take 1 tablet (10 mg total) by mouth 2 (two) times a day as needed for muscle spasms 10/18/20   Meera Patel DO   lisinopril (ZESTRIL) 20 mg tablet Take 1 tablet by mouth daily  Patient not taking: Reported on 7/8/2021    Historical Provider, MD   ondansetron (ZOFRAN-ODT) 4 mg disintegrating tablet Take 1 tablet (4 mg total) by mouth every 8 (eight) hours as needed for nausea or vomiting for up to 30 days 8/3/19 2/17/20  Janet Guerrero MD     I have reviewed home medications with patient personally  Allergies: Allergies   Allergen Reactions    Celecoxib Other (See Comments)     HEADACHES, DIARRHEA    Pollen Extract Other (See Comments)     pine trees:  hives    Starch      sneezing    Tetracycline Other (See Comments)     MIGRAINES       Social History:     Marital Status: Single   Occupation:   Patient Pre-hospital Living Situation:   Patient Pre-hospital Level of Mobility:  Patient Pre-hospital Diet Restrictions:  Substance Use History:   Social History     Substance and Sexual Activity   Alcohol Use No     Social History     Tobacco Use   Smoking Status Former Smoker   Smokeless Tobacco Never Used     Social History     Substance and Sexual Activity   Drug Use No       Family History:    Family History   Problem Relation Age of Onset    Colon cancer Mother     Stroke Mother     Stroke Father        Physical Exam:     Vitals:   Blood Pressure: 142/78 (07/08/21 1539)  Pulse: 74 (07/08/21 1539)  Temperature: 98 2 °F (36 8 °C) (07/08/21 1539)  Temp Source: Oral (07/08/21 0851)  Respirations: 19 (07/08/21 1539)  Height: 5' 7" (170 2 cm) (07/08/21 0851)  Weight - Scale: (!) 142 kg (313 lb 0 9 oz) (07/08/21 0851)  SpO2: 97 % (07/08/21 1539)    Physical Exam  Vitals and nursing note reviewed  Constitutional:       Appearance: Normal appearance  She is obese  She is not ill-appearing  HENT:      Head: Atraumatic  Cardiovascular:      Rate and Rhythm: Normal rate and regular rhythm  Pulses: Normal pulses  Pulmonary:      Effort: Pulmonary effort is normal  No respiratory distress  Breath sounds: Normal breath sounds  Abdominal:      General: Abdomen is flat  Palpations: Abdomen is soft  Musculoskeletal:         General: Normal range of motion  Cervical back: Normal range of motion  Skin:     General: Skin is warm  Neurological:      General: No focal deficit present        Mental Status: She is alert and oriented to person, place, and time  Additional Data:     Lab Results: I have personally reviewed pertinent reports  Results from last 7 days   Lab Units 07/08/21  0908   WBC Thousand/uL 3 97*   HEMOGLOBIN g/dL 13 7   HEMATOCRIT % 43 1   PLATELETS Thousands/uL 81*   NEUTROS PCT % 57   LYMPHS PCT % 34   MONOS PCT % 7   EOS PCT % 1     Results from last 7 days   Lab Units 07/08/21  0908   SODIUM mmol/L 141   POTASSIUM mmol/L 3 9   CHLORIDE mmol/L 106   CO2 mmol/L 25   BUN mg/dL 16   CREATININE mg/dL 0 85   ANION GAP mmol/L 10   CALCIUM mg/dL 9 2   ALBUMIN g/dL 3 2*   TOTAL BILIRUBIN mg/dL 1 31*   ALK PHOS U/L 135*   ALT U/L 22   AST U/L 23   GLUCOSE RANDOM mg/dL 210*         Results from last 7 days   Lab Units 07/08/21  0904   POC GLUCOSE mg/dl 222*               Imaging: I have personally reviewed pertinent reports  CTA head and neck with and without contrast   ED Interpretation by Jenny Subramanian DO (07/08 1123)   CT brain: No acute intracranial abnormality      CT angiography: No stenosis, occlusion or thrombosis of the cervical or intracranial vasculature  CT brain: No acute intracranial abnormality  Final Result by Reggie Santana DO (07/08 1120)      CT brain: No acute intracranial abnormality  CT angiography: No stenosis, occlusion or thrombosis of the cervical or intracranial vasculature  Workstation performed: [id number]         CTA ED chest PE Study   ED Interpretation by Jenny Subramanian DO (07/08 1107)   Compromised by body habitus and respiratory motion with question single subsegmental embolus in each lower lobe  It cannot be determined with certainty if these are real filling defects      2 small right lung nodules  Per the 2017 Fleischner Society guidelines for lung nodules less than 6 mm, no follow-up is necessary for those at low risk for lung cancer  A chest CT with no contrast in one year is optional for those at high risk for lung   cancer  Final Result by Kimani Salas MD (07/08 1041)      Compromised by body habitus and respiratory motion with question single subsegmental embolus in each lower lobe  It cannot be determined with certainty if these are real filling defects  2 small right lung nodules  Per the 2017 Fleischner Society guidelines for lung nodules less than 6 mm, no follow-up is necessary for those at low risk for lung cancer  A chest CT with no contrast in one year is optional for those at high risk for lung    cancer  I personally discussed this study with Phyllis Kirk on 7/8/2021 at 10:41 AM                      Workstation performed: [id number]             EKG, Pathology, and Other Studies Reviewed on Admission:   · EKG: sinus rhythm with is 1 st degree av block  Allscripts / Epic Records Reviewed: Yes     ** Please Note: This note has been constructed using a voice recognition system   **

## 2021-07-08 NOTE — ED PROVIDER NOTES
History  Chief Complaint   Patient presents with    Dizziness     pt reports dizziness, lightheadedness and headache starting this morning at 7am  pt reports hx of MI in january 211     54-year-old female presents with dizziness that started this morning  She states that when she woke up she began feeling dizzy  She states that she feels both lightheaded as well as the room was spinning  She states that this feels exactly the same as when she had her previous heart attack which was earlier this year  Patient denies shortness of breath, denies chest pain, denies headache, denies diaphoresis  She states that she had some nausea at the onset of the dizziness but now she is not nauseous nor is she still dizzy  She has a couple episodes of dizziness while she is in the emergency department  Patient has no focal neurologic deficit, no weakness to upper extremities, bilateral lower extremities are with chronic disability, she states they often swell and there has been no change to her lower extremities  Past medical history of DVT and PEs however now she is on Plavix          Prior to Admission Medications   Prescriptions Last Dose Informant Patient Reported? Taking? ACCU-CHEK SAMEER PLUS test strip 7/8/2021 at Unknown time  Yes Yes   ALPRAZolam (XANAX) 2 MG tablet Past Week at Unknown time  Yes Yes   Sig: Take 0 5 mg by mouth 4 (four) times a day as needed    Blood Glucose Monitoring Suppl (ONE TOUCH ULTRA 2) w/Device KIT 7/8/2021 at Unknown time  Yes Yes   Sig: by Does not apply route   Misc   Devices [facility]) [address] 7/8/2021 at Unknown time  Yes Yes   Sig: by Does not apply route   [address]  7/7/2021 at Unknown time  Yes Yes   Sig: by Does not apply route   albuterol (VENTOLIN HFA) 90 mcg/act inhaler Unknown at Unknown time  Yes No   Sig: Two puffs every four hours as needed for wheezing   aspirin 81 mg chewable tablet 7/8/2021 at Unknown time  Yes Yes   Sig: Chew 81 mg daily atorvastatin (LIPITOR) 20 mg tablet 7/8/2021 at Unknown time  Yes Yes   Sig: Take 20 mg by mouth daily   clopidogrel (PLAVIX) 75 mg tablet 7/8/2021 at Unknown time  Yes Yes   Sig: Take 75 mg by mouth daily   cyclobenzaprine (FLEXERIL) 10 mg tablet Unknown at Unknown time  No No   Sig: Take 1 tablet (10 mg total) by mouth 2 (two) times a day as needed for muscle spasms   glucose blood (ONE TOUCH ULTRA TEST) test strip 7/7/2021 at Unknown time  Yes Yes   Sig: test up to 4 times daily diag 250 00   insulin aspart (NovoLOG) 100 Units/mL SOPN 7/8/2021 at Unknown time  Yes Yes   Sig: Inject under the skin    insulin detemir (LEVEMIR) 100 units/mL subcutaneous injection 7/8/2021 at Unknown time  Yes Yes   Sig: Inject 45 Units under the skin 2 (two) times a day    lisinopril (ZESTRIL) 20 mg tablet Not Taking at Unknown time  Yes No   Sig: Take 1 tablet by mouth daily   Patient not taking: Reported on 7/8/2021   metoprolol succinate (TOPROL-XL) 25 mg 24 hr tablet 7/8/2021 at Unknown time  Yes Yes   Sig: Take 25 mg by mouth daily   ondansetron (ZOFRAN-ODT) 4 mg disintegrating tablet   No No   Sig: Take 1 tablet (4 mg total) by mouth every 8 (eight) hours as needed for nausea or vomiting for up to 30 days   oxyCODONE (ROXICODONE) 30 MG immediate release tablet 7/7/2021 at Unknown time  Yes Yes   Sig: Take 30 mg by mouth every 4 (four) hours as needed    rosuvastatin (CRESTOR) 40 MG tablet 7/8/2021 at Unknown time  Yes Yes   Sig: Take 40 mg by mouth daily      Facility-Administered Medications: None       Past Medical History:   Diagnosis Date    Anxiety     Arthritis     Asthma     Depression     Diabetes mellitus ([facility] [address] )     Disease of thyroid gland     Hip dislocation, right (HCC)     no hip on the left    History of transfusion     Hyperlipidemia     borderline    Hypertension     Migraine     Psychiatric disorder     anxiety, depression       Past Surgical History:   Procedure Laterality Date    CERVICAL FUSION      X2    FRACTURE SURGERY      plates left leg , rods right leg    HIP ARTHROPLASTY      JOINT REPLACEMENT      9 THR , 3 rev, no left hip , RTKR    KNEE ARTHROPLASTY      NECK SURGERY      SHOULDER ARTHROSCOPY      TOTAL SHOULDER REPLACEMENT Right        Family History   Problem Relation Age of Onset    Colon cancer Mother     Stroke Mother     Stroke Father      I have reviewed and agree with the history as documented  E-Cigarette/Vaping     E-Cigarette/Vaping Substances     Social History     Tobacco Use    Smoking status: Former Smoker    Smokeless tobacco: Never Used   Substance Use Topics    Alcohol use: No    Drug use: No       Review of Systems   Constitutional: Negative for chills, diaphoresis, fatigue and fever  HENT: Negative for congestion, ear discharge, ear pain and sore throat  Respiratory: Negative for cough, chest tightness, shortness of breath and wheezing  Cardiovascular: Negative for chest pain and palpitations  Gastrointestinal: Positive for nausea  Negative for abdominal pain, constipation, diarrhea and vomiting  Genitourinary: Negative for dysuria and flank pain  Musculoskeletal: Negative for back pain, neck pain and neck stiffness  Skin: Negative for color change and rash  Allergic/Immunologic: Negative for immunocompromised state  Neurological: Positive for dizziness and light-headedness  Negative for syncope, weakness, numbness and headaches  Hematological: Does not bruise/bleed easily  Psychiatric/Behavioral: The patient is nervous/anxious  Physical Exam  Physical Exam  Vitals reviewed  Constitutional:       General: She is not in acute distress  Appearance: She is well-developed  She is obese  She is not ill-appearing, toxic-appearing or diaphoretic  HENT:      Head: Normocephalic and atraumatic  Right Ear: Tympanic membrane normal       Left Ear: Tympanic membrane normal    Eyes:      General: No scleral icterus  Right eye: No discharge  Left eye: No discharge  Conjunctiva/sclera: Conjunctivae normal       Pupils: Pupils are equal, round, and reactive to light  Neck:      Vascular: No JVD  Cardiovascular:      Rate and Rhythm: Normal rate and regular rhythm  Heart sounds: Normal heart sounds  No murmur heard  No friction rub  No gallop  Pulmonary:      Effort: Pulmonary effort is normal  No respiratory distress  Breath sounds: Normal breath sounds  No wheezing, rhonchi or rales  Comments: Mild tachypnea  Chest:      Chest wall: No tenderness  Abdominal:      General: Bowel sounds are normal  There is no distension  Palpations: Abdomen is soft  Tenderness: There is no abdominal tenderness  There is no right CVA tenderness, left CVA tenderness or guarding  Musculoskeletal:         General: No tenderness or deformity  Normal range of motion  Cervical back: Normal range of motion and neck supple  No rigidity  Right lower leg: No edema  Left lower leg: No edema  Skin:     General: Skin is warm and dry  Coloration: Skin is not pale  Findings: No erythema or rash  Neurological:      General: No focal deficit present  Mental Status: She is alert and oriented to person, place, and time  Cranial Nerves: No cranial nerve deficit  Comments:  GCS 15  AAOx3  No focal neuro deficits  CN II-XII grossly intact  Speech normal, no aphasia or dysarthria  PERRL  EOMI  Peripheral vision intact  No nystagmus  Upper  extremity strength 5/5 through  Chronic lower ext disability   strength 5/5 b/l  Gross sensation intact b/l       Psychiatric:         Behavior: Behavior normal          Vital Signs  ED Triage Vitals [07/08/21 0851]   Temperature Pulse Respirations Blood Pressure SpO2   97 8 °F (36 6 °C) 91 18 (!) 183/83 98 %      Temp Source Heart Rate Source Patient Position - Orthostatic VS BP Location FiO2 (%)   Oral Monitor Sitting Right arm -- Pain Score       No Pain           Vitals:    07/08/21 1145 07/08/21 1200 07/08/21 1215 07/08/21 1230   BP: (!) 188/83 (!) 177/84 (!) 177/80 148/70   Pulse: 78 76 77 80   Patient Position - Orthostatic VS: Lying Lying Lying Lying         Visual Acuity      ED Medications  Medications   sodium chloride 0 9 % bolus 1,000 mL (1,000 mL Intravenous New Bag 7/8/21 1010)   iohexol (OMNIPAQUE) 350 MG/ML injection (SINGLE-DOSE) 100 mL (100 mL Intravenous Given 7/8/21 1008)   meclizine (ANTIVERT) tablet 25 mg (25 mg Oral Given 7/8/21 1116)   metoprolol (LOPRESSOR) injection 5 mg (5 mg Intravenous Given 7/8/21 1155)   diazepam (VALIUM) tablet 5 mg (5 mg Oral Given 7/8/21 1155)   aspirin chewable tablet 324 mg (243 mg Oral Given 7/8/21 1201)       Diagnostic Studies  Results Reviewed     Procedure Component Value Units Date/Time    CBC and differential [[id number]]  (Abnormal) Collected: 07/08/21 0908    Lab Status: Final result Specimen: Blood from Arm, Left Updated: 07/08/21 0956     WBC 3 97 Thousand/uL      RBC 5 11 Million/uL      Hemoglobin 13 7 g/dL      Hematocrit 43 1 %      MCV 84 fL      MCH 26 8 pg      MCHC 31 8 g/dL      RDW 13 9 %      MPV 11 4 fL      Platelets 81 Thousands/uL      nRBC 0 /100 WBCs      Neutrophils Relative 57 %      Immat GRANS % 0 %      Lymphocytes Relative 34 %      Monocytes Relative 7 %      Eosinophils Relative 1 %      Basophils Relative 1 %      Neutrophils Absolute 2 22 Thousands/µL      Immature Grans Absolute 0 01 Thousand/uL      Lymphocytes Absolute 1 36 Thousands/µL      Monocytes Absolute 0 29 Thousand/µL      Eosinophils Absolute 0 05 Thousand/µL      Basophils Absolute 0 04 Thousands/µL     Comprehensive metabolic panel [[id number]]  (Abnormal) Collected: 07/08/21 0908    Lab Status: Final result Specimen: Blood from Arm, Left Updated: 07/08/21 0948     Sodium 141 mmol/L      Potassium 3 9 mmol/L      Chloride 106 mmol/L      CO2 25 mmol/L      ANION GAP 10 mmol/L      BUN 16 mg/dL      Creatinine 0 85 mg/dL      Glucose 210 mg/dL      Calcium 9 2 mg/dL      Corrected Calcium 9 8 mg/dL      AST 23 U/L      ALT 22 U/L      Alkaline Phosphatase 135 U/L      Total Protein 7 2 g/dL      Albumin 3 2 g/dL      Total Bilirubin 1 31 mg/dL      eGFR 77 ml/min/1 73sq m     Narrative:      [place] guidelines for Chronic Kidney Disease (CKD):     Stage 1 with normal or high GFR (GFR > 90 mL/min/1 73 square meters)    Stage 2 Mild CKD (GFR = 60-89 mL/min/1 73 square meters)    Stage 3A Moderate CKD (GFR = 45-59 mL/min/1 73 square meters)    Stage 3B Moderate CKD (GFR = 30-44 mL/min/1 73 square meters)    Stage 4 Severe CKD (GFR = 15-29 mL/min/1 73 square meters)    Stage 5 End Stage CKD (GFR <15 mL/min/1 73 square meters)  Note: GFR calculation is accurate only with a steady state creatinine    NT-BNP PRO [[id number]]  (Normal) Collected: 07/08/21 0908    Lab Status: Final result Specimen: Blood from Arm, Left Updated: 07/08/21 0948     NT-proBNP 68 pg/mL     Magnesium [[id number]]  (Normal) Collected: 07/08/21 0908    Lab Status: Final result Specimen: Blood from Arm, Left Updated: 07/08/21 0948     Magnesium 2 0 mg/dL     Troponin I [[id number]]  (Normal) Collected: 07/08/21 0908    Lab Status: Final result Specimen: Blood from Arm, Left Updated: 07/08/21 0941     Troponin I <0 02 ng/mL     UA w Reflex to Microscopic w Reflex to Culture [[id number]]     Lab Status: No result Specimen: Urine     Fingerstick Glucose (POCT) [[id number]]  (Abnormal) Collected: 07/08/21 0904    Lab Status: Final result Updated: 07/08/21 0908     POC Glucose 222 mg/dl                  CTA head and neck with and without contrast   ED Interpretation by Carlos Carroll DO (07/08 1123)   CT brain: No acute intracranial abnormality      CT angiography: No stenosis, occlusion or thrombosis of the cervical or intracranial vasculature  CT brain: No acute intracranial abnormality          Final Result by Mae Hoffman DO (07/08 1120)      CT brain: No acute intracranial abnormality  CT angiography: No stenosis, occlusion or thrombosis of the cervical or intracranial vasculature  Workstation performed: [id number]         CTA ED chest PE Study   ED Interpretation by Pasquale Helm DO (07/08 1107)   Compromised by body habitus and respiratory motion with question single subsegmental embolus in each lower lobe  It cannot be determined with certainty if these are real filling defects      2 small right lung nodules  Per the 2017 Fleischner Society guidelines for lung nodules less than 6 mm, no follow-up is necessary for those at low risk for lung cancer  A chest CT with no contrast in one year is optional for those at high risk for lung   cancer  Final Result by Emanuel Fernandez MD (07/08 1041)      Compromised by body habitus and respiratory motion with question single subsegmental embolus in each lower lobe  It cannot be determined with certainty if these are real filling defects  2 small right lung nodules  Per the 2017 Fleischner Society guidelines for lung nodules less than 6 mm, no follow-up is necessary for those at low risk for lung cancer  A chest CT with no contrast in one year is optional for those at high risk for lung    cancer  I personally discussed this study with Silvia Fontanez on 7/8/2021 at 10:41 AM                      Workstation performed: [id number]                    Procedures  Procedures         ED Course  ED Course as of Jul 08 1423   Thu Jul 08, 2021   6669 Troponin I: <0 02   1110 Patient had episode of dizziness while here, given meclizine and fluid  1110 CTA PE, equivocal for PE, likely will admit for observation  Awaiting CTA head and neck        1155 TT sent to [facility] for admission for cardiac obs and stroke pathway                                              MDM  Number of Diagnoses or Management Options  Dizziness  Diagnosis management comments: Dizziness  Concern for stroke, cardiac event  Patient Has hx of PEs  CTA Head/neck, cta pe, cardiac work up  Work up is normal now but will admit for stroke work up, and cardiac obs  Likely positional vertigo and given meclezine  But will need further work up to r/o stroke and acs       Amount and/or Complexity of Data Reviewed  Clinical lab tests: ordered and reviewed  Tests in the radiology section of CPT®: ordered and reviewed        Disposition  Final diagnoses:   Dizziness     Time reflects when diagnosis was documented in both MDM as applicable and the Disposition within this note     Time User Action Codes Description Comment    7/8/2021  1:13 PM Lucia Bryant Add [R42] Dizziness       ED Disposition     ED Disposition Condition Date/Time Comment    Admit Stable Thu Jul 8, 2021  1:13 PM Case was discussed with Kirsten (SLIM) and the patient's admission status was agreed to be Admission Status: observation status to the service of Dr Wyatt Patiño   Follow-up Information    None         Current Discharge Medication List      CONTINUE these medications which have NOT CHANGED    Details   ! ! ACCU-CHEK SAMEER PLUS test strip Refills: 0      ALPRAZolam (XANAX) 2 MG tablet Take 0 5 mg by mouth 4 (four) times a day as needed       aspirin 81 mg chewable tablet Chew 81 mg daily      atorvastatin (LIPITOR) 20 mg tablet Take 20 mg by mouth daily      Blood Glucose Monitoring Suppl (ONE TOUCH ULTRA 2) w/Device KIT by Does not apply route      clopidogrel (PLAVIX) 75 mg tablet Take 75 mg by mouth daily      !! glucose blood (ONE TOUCH ULTRA TEST) test strip test up to 4 times daily diag 250 00      insulin aspart (NovoLOG) 100 Units/mL SOPN Inject under the skin       insulin detemir (LEVEMIR) 100 units/mL subcutaneous injection Inject 45 Units under the skin 2 (two) times a day       metoprolol succinate (TOPROL-XL) 25 mg 24 hr tablet Take 25 mg by mouth daily      Misc  Devices [facility] MISC by Does not apply route      ONETOUCH DELICA LANCETS FINE MISC by Does not apply route      oxyCODONE (ROXICODONE) 30 MG immediate release tablet Take 30 mg by mouth every 4 (four) hours as needed       rosuvastatin (CRESTOR) 40 MG tablet Take 40 mg by mouth daily      albuterol (VENTOLIN HFA) 90 mcg/act inhaler Two puffs every four hours as needed for wheezing    Comments: Substitution to a formulary equivalent within the same pharmaceutical class is authorized  cyclobenzaprine (FLEXERIL) 10 mg tablet Take 1 tablet (10 mg total) by mouth 2 (two) times a day as needed for muscle spasms  Qty: 20 tablet, Refills: 0    Associated Diagnoses: Neck pain      lisinopril (ZESTRIL) 20 mg tablet Take 1 tablet by mouth daily      ondansetron (ZOFRAN-ODT) 4 mg disintegrating tablet Take 1 tablet (4 mg total) by mouth every 8 (eight) hours as needed for nausea or vomiting for up to 30 days  Qty: 12 tablet, Refills: 0    Associated Diagnoses: Nausea vomiting and diarrhea       !! - Potential duplicate medications found  Please discuss with provider  No discharge procedures on file      PDMP Review     None          ED Provider  Electronically Signed by           Susan Vega DO  07/08/21 7933

## 2021-07-08 NOTE — NURSING NOTE
Pt new admit to the unit  Pt not given daily medications scheduled for the evening as pt stated she was instructed by ED to take home medications on admission

## 2021-07-08 NOTE — ASSESSMENT & PLAN NOTE
CTA head and neck  IMPRESSION:  CT brain: No acute intracranial abnormality    CT angiography: No stenosis, occlusion or thrombosis of the cervical or intracranial vasculature        Likely sec to vertigo vs panic attack  Monitor   neurochecks

## 2021-07-08 NOTE — ED NOTES
Pt took morning meds with RN- asa 81, plavix 75, toprol xr 25, levemir 49units subc with Dr Cary Fiore, ARI  07/08/21 1200

## 2021-07-08 NOTE — ASSESSMENT & PLAN NOTE
Lab Results   Component Value Date    HGBA1C 9 3 (H) 01/11/2021       Recent Labs     07/08/21  0904   POCGLU 222*       Blood Sugar Average: Last 72 hrs:  (P) 222     Uncontrolled  Continue insulin

## 2021-07-09 VITALS
DIASTOLIC BLOOD PRESSURE: 69 MMHG | BODY MASS INDEX: 45.99 KG/M2 | HEART RATE: 76 BPM | TEMPERATURE: 98.2 F | OXYGEN SATURATION: 96 % | WEIGHT: 293 LBS | HEIGHT: 67 IN | RESPIRATION RATE: 18 BRPM | SYSTOLIC BLOOD PRESSURE: 133 MMHG

## 2021-07-09 LAB
ANION GAP SERPL CALCULATED.3IONS-SCNC: 9 MMOL/L (ref 4–13)
BUN SERPL-MCNC: 16 MG/DL (ref 5–25)
CALCIUM SERPL-MCNC: 8.8 MG/DL (ref 8.3–10.1)
CHLORIDE SERPL-SCNC: 107 MMOL/L (ref 100–108)
CHOLEST SERPL-MCNC: 137 MG/DL (ref 50–200)
CO2 SERPL-SCNC: 26 MMOL/L (ref 21–32)
CREAT SERPL-MCNC: 0.84 MG/DL (ref 0.6–1.3)
EST. AVERAGE GLUCOSE BLD GHB EST-MCNC: 183 MG/DL
GFR SERPL CREATININE-BSD FRML MDRD: 78 ML/MIN/1.73SQ M
GLUCOSE SERPL-MCNC: 182 MG/DL (ref 65–140)
GLUCOSE SERPL-MCNC: 187 MG/DL (ref 65–140)
GLUCOSE SERPL-MCNC: 196 MG/DL (ref 65–140)
GLUCOSE SERPL-MCNC: 230 MG/DL (ref 65–140)
HBA1C MFR BLD: 8 %
HDLC SERPL-MCNC: 38 MG/DL
LDLC SERPL CALC-MCNC: 73 MG/DL (ref 0–100)
NONHDLC SERPL-MCNC: 99 MG/DL
POTASSIUM SERPL-SCNC: 3.9 MMOL/L (ref 3.5–5.3)
SODIUM SERPL-SCNC: 142 MMOL/L (ref 136–145)
TRIGL SERPL-MCNC: 128 MG/DL

## 2021-07-09 PROCEDURE — 99204 OFFICE O/P NEW MOD 45 MIN: CPT | Performed by: PSYCHIATRY & NEUROLOGY

## 2021-07-09 PROCEDURE — 99217 PR OBSERVATION CARE DISCHARGE MANAGEMENT: CPT | Performed by: INTERNAL MEDICINE

## 2021-07-09 PROCEDURE — 80061 LIPID PANEL: CPT | Performed by: INTERNAL MEDICINE

## 2021-07-09 PROCEDURE — 82948 REAGENT STRIP/BLOOD GLUCOSE: CPT

## 2021-07-09 PROCEDURE — 80048 BASIC METABOLIC PNL TOTAL CA: CPT | Performed by: INTERNAL MEDICINE

## 2021-07-09 RX ORDER — MECLIZINE HYDROCHLORIDE 25 MG/1
25 TABLET ORAL EVERY 8 HOURS PRN
Qty: 20 TABLET | Refills: 0 | Status: SHIPPED | OUTPATIENT
Start: 2021-07-09

## 2021-07-09 RX ADMIN — INSULIN DETEMIR 45 UNITS: 100 INJECTION, SOLUTION SUBCUTANEOUS at 10:02

## 2021-07-09 RX ADMIN — INSULIN ASPART 1 UNITS: 100 INJECTION, SOLUTION INTRAVENOUS; SUBCUTANEOUS at 12:29

## 2021-07-09 RX ADMIN — CLOPIDOGREL BISULFATE 75 MG: 75 TABLET ORAL at 08:05

## 2021-07-09 RX ADMIN — OXYCODONE HYDROCHLORIDE 30 MG: 10 TABLET ORAL at 08:05

## 2021-07-09 RX ADMIN — ASPIRIN 81 MG CHEWABLE TABLET 81 MG: 81 TABLET CHEWABLE at 08:06

## 2021-07-09 RX ADMIN — OXYCODONE HYDROCHLORIDE 30 MG: 10 TABLET ORAL at 17:03

## 2021-07-09 RX ADMIN — ENOXAPARIN SODIUM 40 MG: 40 INJECTION SUBCUTANEOUS at 08:06

## 2021-07-09 RX ADMIN — OXYCODONE HYDROCHLORIDE 30 MG: 10 TABLET ORAL at 13:35

## 2021-07-09 RX ADMIN — INSULIN ASPART 15 UNITS: 100 INJECTION, SOLUTION INTRAVENOUS; SUBCUTANEOUS at 12:29

## 2021-07-09 RX ADMIN — METOPROLOL SUCCINATE 25 MG: 25 TABLET, EXTENDED RELEASE ORAL at 08:06

## 2021-07-09 NOTE — ASSESSMENT & PLAN NOTE
- S/P stenting in January 2021  - continue management per primary team   - Continue to follow-up with cardiologist as an outpatient

## 2021-07-09 NOTE — ASSESSMENT & PLAN NOTE
Lab Results   Component Value Date    HGBA1C 9 3 (H) 01/11/2021       Recent Labs     07/08/21  0904 07/08/21 2006 07/09/21  0620   POCGLU 222* 250* 187*       Blood Sugar Average: Last 72 hrs:  (P) 260 [id number]     -Recommend Euglycemia   - Recommend management per primary team
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
## 2021-07-09 NOTE — CONSULTS
Consultation - Neurology    Darwin Briseno 54 y o  female MRN: [id number]  Unit/Bed#: -01 Encounter: [id number]      Assessment/Plan     * Dizziness  Assessment & Plan  53 yo F with multiple medical comorbidities including but not limited to cervicalgia, wheelchair bound since 2009, NSTEMI s/p stenting in January 2021, Type 2 DM, anxiety/depression, presenting to [facility] on 7/8/21 due to dizziness  Pt was concerned about her dizziness because it is similar to how she felt when she experienced NSTEMI in January 2021  Pt denies having any dizziness today  Pt felt prn meclizine on 7/8/21 helped relieve her dizziness  DDx includes vertigo versus panic attack versus hypertensive urgency  Plan  · Continue meclizine 25 mg po q8h prn dizziness  · BP control per primary care team, goal normotension  · Pt may benefit from outpatient vestibular therapy   · Continue following with Dr Matthew Mendoza and Dr Bryan Gan for cervicalgia  · Recommend utilizing Tempurpedic cervical pillow at home   · Fall precautions  · Encourage PO hydration   · No further inpatient neurological recommendations at this time     Workup   · Imaging  · CT brain and CTA head/neck 7/8/21: CT brain: No acute intracranial abnormality  CT angiography: No stenosis, occlusion or thrombosis of the cervical or intracranial vasculature    · EKG 7/8/21: Sinus rhythm with 1st degree A-V block, Cannot rule out Anterior infarct but no significant change compared to ECG of 4/12/2019  · Labs   · CBC, CMP, Troponin 7/8/21 unremarkable   · Lipid panel 7/9/21 unremarkable     Anxiety and depression  Assessment & Plan  - continue management per primary team     Type II diabetes mellitus with neurological manifestations [facility])  Assessment & Plan  Lab Results   Component Value Date    HGBA1C 9 3 (H) 01/11/2021       Recent Labs     07/08/21  0904 07/08/21 2006 07/09/21  0620   POCGLU 222* 250* 187*       Blood Sugar Average: Last 72 hrs:  (P) 046 [id number] -Recommend Euglycemia   - Recommend management per primary team    Atherosclerosis of coronary artery  Assessment & Plan  - S/P stenting in January 2021  - continue management per primary team   - Continue to follow-up with cardiologist as an outpatient        Shandra Lock will not need outpatient follow up with Neurology  She will not require outpatient neurological testing  History of Present Illness     Reason for Consult / Principal Problem: Dizzness  Hx and PE limited by: N/A  HPI: Shandra Lock is a wheelchair bound (since 2009)  54 y o   female with multiple medical comorbidities including but not limited to type 2 DM, anxiety/depression, cervicalgia, and Non-ST elevation MI s/p stents in January 2021, who presented to [facility] on 7/8/21 with complaints of dizziness, which improved after receiving meclizine  Pt presented to ED due to lightheadedness and dizziness on 7/8/21, which she felt was similar to when she had a non-ST elevation MI in January 2021 (underwent cardiac catheterization with stenting at [facility] center at that time, pt now follows with Dr aDnn Molina, cardiology at [facility], most recent appointment 4/30/21)  CTA head and neck on 7/8/21 without intracranial abnormality and without any stenosis, occlusion, or thrombosis of cervical or intracranial vasculature  BP upon arrival to /83  CBC, CMP, troponin, ECG (compared to ECG 4/12/2019) unremarkable in ED  Upon chart review, pt has presented to [address] [place] [address] for similar episodes of dizziness in April 2021 (thought to be secondary to URI) and March 2021 (improved after given antivert)  Pt notes that the past few times she went to the ED for similar episodes of dizziness, she was told "that I was having a panic attack " Pt reports the feeling of dizziness yesterday was similar to the previous times she went to the ED for a similar complaint   She describes the dizziness as, "having a hot flash, then chills go throughout my body, then I feel lightheaded and dizzy " She reports that this happens to her every so often but that she only goes to the ED for the issue if it happens multiple times in the same day because that is how she felt when she had her NSTEMI in January 2021  Pt was offered an MRI by primary team, but pt adamantly refuses MRI as an inpatient due to being severely claustrophobic  Pt states she had an experience in the past where, "they knocked me out to get an MRI and then I woke up in the machine, so I'm not doing that again " Pt states her last MRI was last year, performed in [facility] at an open MRI  Patient evaluated by Dr Marilou Taylor with neurology in October 2016 for cervical pain and headache  Pt was recommended to start gabapentin, obtain MRI w/o contrast and f/u with neurology in 6 weeks; however, no MRI obtained and no follow-up noted  Pt has been following with Dr Anila Lau ([address]) for cervicalgia  Pt has had multiple cervical spine surgeries (with Dr Lolita Rebolledo), most recently in January 2020 for cervical spinal revision surgery  Pt also follows with Dr Darcie Lund, orthopaedic surgery  Pt reports some concern about her blood pressure today  She states that she has been taking Toprol-XL since January s/p stent placement  Pt reports that she was previously taking lisinopril in addition to her Toprol-XL but that this medication was stopped by her PCP or cardiologist due to having hypotensive episodes  Pt denies dizziness today  She reports her most recent episode of dizziness was last night when she went from sitting to lying her head back flat to go to sleep  She states the room felt like it was spinning but that the feeling passed within a few seconds  She reports when she does have episodes of dizziness, she feels like it happens most often with positional changes  She reports that the feeling usually subsides within seconds/minutes         Inpatient consult to Neurology  Consult performed by: Marika Cano Eunice Loya PA-C  Consult ordered by: Mar Gordon MD          Review of Systems   Neurological: Negative for dizziness, light-headedness and headaches  12 point review of systems performed and negative except as indicated above  Historical Information   Past Medical History:   Diagnosis Date    Anxiety     Arthritis     Asthma     Depression     Diabetes mellitus ([address] )     Disease of thyroid gland     Hip dislocation, right (HCC)     no hip on the left    History of transfusion     Hyperlipidemia     borderline    Hypertension     Migraine     Psychiatric disorder     anxiety, depression     Past Surgical History:   Procedure Laterality Date    CERVICAL FUSION      X2    FRACTURE SURGERY      plates left leg , rods right leg    HIP ARTHROPLASTY      JOINT REPLACEMENT      9 THR , 3 rev, no left hip , RTKR    KNEE ARTHROPLASTY      NECK SURGERY      SHOULDER ARTHROSCOPY      TOTAL SHOULDER REPLACEMENT Right      Social History   Social History     Substance and Sexual Activity   Alcohol Use No     Social History     Substance and Sexual Activity   Drug Use No     E-Cigarette/Vaping    E-Cigarette Use Never User      E-Cigarette/Vaping Substances    Nicotine No     THC No     CBD No     Flavoring No     Other No     Unknown No      Social History     Tobacco Use   Smoking Status Former Smoker   Smokeless Tobacco Never Used     Family History:   Family History   Problem Relation Age of Onset    Colon cancer Mother     Stroke Mother     Stroke Father        Review of previous medical records was  completed       Meds/Allergies   current meds:   Current Facility-Administered Medications   Medication Dose Route Frequency    acetaminophen (TYLENOL) tablet 650 mg  650 mg Oral Q6H PRN    albuterol (PROVENTIL HFA,VENTOLIN HFA) inhaler 1 puff  1 puff Inhalation Q4H PRN    ALPRAZolam (XANAX) tablet 0 5 mg  0 5 mg Oral TID PRN    aspirin chewable tablet 81 mg  81 mg Oral Daily    atorvastatin (LIPITOR) tablet 40 mg  40 mg Oral Daily With Dinner    atorvastatin (LIPITOR) tablet 80 mg  80 mg Oral Daily With Dinner    clopidogrel (PLAVIX) tablet 75 mg  75 mg Oral Daily    cyclobenzaprine (FLEXERIL) tablet 10 mg  10 mg Oral BID PRN    enoxaparin (LOVENOX) subcutaneous injection 40 mg  40 mg Subcutaneous Daily    hydrocortisone 1 % ointment   Topical 4x Daily PRN    insulin aspart (NovoLOG) subcutaneous injection 1-5 Units  1-5 Units Subcutaneous TID AC    insulin aspart (NovoLOG) subcutaneous injection 1-5 Units  1-5 Units Subcutaneous HS    insulin aspart (NovoLOG) subcutaneous injection 15 Units  15 Units Subcutaneous TID With Meals    insulin detemir (LEVEMIR) subcutaneous injection 45 Units  45 Units Subcutaneous Q12H ANASTASIYA    meclizine (ANTIVERT) tablet 25 mg  25 mg Oral Q8H PRN    metoprolol succinate (TOPROL-XL) 24 hr tablet 25 mg  25 mg Oral Daily    ondansetron (ZOFRAN) injection 4 mg  4 mg Intravenous Q6H PRN    oxyCODONE (ROXICODONE) immediate release tablet 30 mg  30 mg Oral Q4H PRN    and PTA meds:   Prior to Admission Medications   Prescriptions Last Dose Informant Patient Reported? Taking? ACCU-CHEK SAMEER PLUS test strip 7/8/2021 at Unknown time  Yes Yes   ALPRAZolam (XANAX) 2 MG tablet Past Week at Unknown time  Yes Yes   Sig: Take 0 5 mg by mouth 4 (four) times a day as needed    Blood Glucose Monitoring Suppl (ONE TOUCH ULTRA 2) w/Device KIT 7/8/2021 at Unknown time  Yes Yes   Sig: by Does not apply route   Misc   Devices [facility]) [address] [facility] 7/8/2021 at Unknown time  Yes Yes   Sig: by Does not apply route   [address]  7/7/2021 at Unknown time  Yes Yes   Sig: by Does not apply route   albuterol (VENTOLIN HFA) 90 mcg/act inhaler Unknown at Unknown time  Yes No   Sig: Two puffs every four hours as needed for wheezing   aspirin 81 mg chewable tablet 7/8/2021 at Unknown time  Yes Yes   Sig: Chew 81 mg daily   atorvastatin (LIPITOR) 20 mg tablet 7/8/2021 at Unknown time  Yes Yes   Sig: Take 20 mg by mouth daily   clopidogrel (PLAVIX) 75 mg tablet 7/8/2021 at Unknown time  Yes Yes   Sig: Take 75 mg by mouth daily   cyclobenzaprine (FLEXERIL) 10 mg tablet Unknown at Unknown time  No No   Sig: Take 1 tablet (10 mg total) by mouth 2 (two) times a day as needed for muscle spasms   diazepam (VALIUM) 5 mg tablet  Self Yes Yes   Sig: Take 5 mg by mouth every 8 (eight) hours as needed for muscle spasms   glucose blood (ONE TOUCH ULTRA TEST) test strip 7/7/2021 at Unknown time  Yes Yes   Sig: test up to 4 times daily diag 250 00   insulin aspart (NovoLOG) 100 Units/mL SOPN 7/8/2021 at Unknown time  Yes Yes   Sig: Inject under the skin    insulin detemir (LEVEMIR) 100 units/mL subcutaneous injection 7/8/2021 at Unknown time  Yes Yes   Sig: Inject 45 Units under the skin 2 (two) times a day    lisinopril (ZESTRIL) 20 mg tablet Not Taking at Unknown time  Yes No   Sig: Take 1 tablet by mouth daily   Patient not taking: Reported on 7/8/2021   metoprolol succinate (TOPROL-XL) 25 mg 24 hr tablet 7/8/2021 at Unknown time  Yes Yes   Sig: Take 25 mg by mouth daily   ondansetron (ZOFRAN-ODT) 4 mg disintegrating tablet   No No   Sig: Take 1 tablet (4 mg total) by mouth every 8 (eight) hours as needed for nausea or vomiting for up to 30 days   oxyCODONE (ROXICODONE) 30 MG immediate release tablet 7/7/2021 at Unknown time  Yes Yes   Sig: Take 30 mg by mouth every 4 (four) hours as needed    rosuvastatin (CRESTOR) 40 MG tablet 7/8/2021 at Unknown time  Yes Yes   Sig: Take 40 mg by mouth daily      Facility-Administered Medications: None       Allergies   Allergen Reactions    Celecoxib Other (See Comments)     HEADACHES, DIARRHEA    Pollen Extract Other (See Comments)     pine trees:  hives    Starch      sneezing    Tetracycline Other (See Comments)     MIGRAINES       Objective   Vitals:Blood pressure 133/69, pulse 76, temperature 98 2 °F (36 8 °C), resp   rate 18, height 5' 7" (1 702 m), weight (!) 142 kg (313 lb 0 9 oz), SpO2 96 %  ,Body mass index is 49 03 kg/m²  Intake/Output Summary (Last 24 hours) at 7/9/2021 1643  Last data filed at 7/9/2021 0300  Gross per 24 hour   Intake 480 ml   Output 0 ml   Net 480 ml       Invasive Devices: Invasive Devices     Peripheral Intravenous Line            Peripheral IV 07/08/21 Medial;Right Antecubital 1 day                Physical Exam  Vitals and nursing note reviewed  Constitutional:       Appearance: She is obese  HENT:      Head: Normocephalic and atraumatic  Nose: Nose normal  No congestion or rhinorrhea  Mouth/Throat:      Mouth: Mucous membranes are moist       Pharynx: Oropharynx is clear  No oropharyngeal exudate or posterior oropharyngeal erythema  Eyes:      General: No scleral icterus  Right eye: No discharge  Left eye: No discharge  Extraocular Movements: Extraocular movements intact and EOM normal       Conjunctiva/sclera: Conjunctivae normal       Pupils: Pupils are equal, round, and reactive to light  Cardiovascular:      Rate and Rhythm: Normal rate  Pulmonary:      Effort: Pulmonary effort is normal    Neurological:      Mental Status: She is alert and oriented to person, place, and time  Coordination: Finger-Nose-Finger Test normal       Deep Tendon Reflexes:      Reflex Scores:       Bicep reflexes are 2+ on the right side and 2+ on the left side  Brachioradialis reflexes are 2+ on the right side and 2+ on the left side  Patellar reflexes are 2+ on the right side and 2+ on the left side  Psychiatric:         Speech: Speech normal       Comments: Pleasant and cooperative during exam        Neurologic Exam     Mental Status   Oriented to person, place, and time  Follows 2 step commands  Attention: normal  Concentration: normal    Speech: speech is normal   Level of consciousness: alert  Able to perform simple calculations (dime+nickel+kaden)     Able to name object (button, pen, glove)  - able to name current president and previous president   - able to spell world "WORLD" forward and backward  - able to name associations between apple/orange (fruit, have seeds, round) and bicycle/train (have wheels)        Cranial Nerves   Cranial nerves II through XII intact  CN III, IV, VI   Pupils are equal, round, and reactive to light  Extraocular motions are normal    Right pupil: Size: 3 mm  Shape: regular  Reactivity: brisk  Consensual response: intact  Accommodation: intact  Left pupil: Size: 3 mm  Shape: regular  Reactivity: brisk  Consensual response: intact  Accommodation: intact  Nystagmus: none     CN V   Facial sensation intact  CN VII   Facial expression full, symmetric       CN VIII   CN VIII normal    Hearing: intact    CN IX, X   CN IX normal    CN X normal      CN XI   CN XI normal      CN XII   CN XII normal      Motor Exam   Muscle bulk: normal BUE, decreased BLE   Right arm tone: normal  Left arm tone: normal  Right arm pronator drift: absent  Left arm pronator drift: absent    Strength   Right strength: RUE 5/5    Knee flexion/extension: 5/5  Ankle flexion/extension: 5/5    Unable to assess hip flexion/extension due to pt limitation (hip dislocation R hi)   Left strength: LUE 5/5    Knee flexion/extension: 5/5  Ankle flexion/extension: 5/5    Unable to assess hip flexion/extension b/l due to pt limitation (hip joint absent L hip)         Sensory Exam   Light touch normal    Right arm vibration: normal  Left arm vibration: normal  Right leg vibration: decreased from ankle (known diabetic neuropathy )  Left leg vibration: normal    Gait, Coordination, and Reflexes     Gait  Gait: (deferred, pt wheelchair bound at baseline )    Coordination   Finger to nose coordination: normal    Tremor   Resting tremor: absent  Intention tremor: absent    Reflexes   Right brachioradialis: 2+  Left brachioradialis: 2+  Right biceps: 2+  Left biceps: 2+  Right patellar: 2+  Left patellar: 2+      Lab Results:   CBC:   Results from last 7 days   Lab Units 07/08/21  0908   WBC Thousand/uL 3 97*   RBC Million/uL 5 11   HEMOGLOBIN g/dL 13 7   HEMATOCRIT % 43 1   MCV fL 84   PLATELETS Thousands/uL 81*   , BMP/CMP:   Results from last 7 days   Lab Units 07/09/21  0511 07/08/21  0908   SODIUM mmol/L 142 141   POTASSIUM mmol/L 3 9 3 9   CHLORIDE mmol/L 107 106   CO2 mmol/L 26 25   BUN mg/dL 16 16   CREATININE mg/dL 0 84 0 85   CALCIUM mg/dL 8 8 9 2   AST U/L  --  23   ALT U/L  --  22   ALK PHOS U/L  --  135*   EGFR ml/min/1 73sq m 78 77   , HgBA1C:   Results from last 7 days   Lab Units 07/08/21  0908   HEMOGLOBIN A1C % 8 0*   , TSH:   , Lipid Profile:   Results from last 7 days   Lab Units 07/09/21  0511   HDL mg/dL 38*   LDL CALC mg/dL 73   TRIGLYCERIDES mg/dL 128     Imaging Studies: I have personally reviewed pertinent reports  and I have personally reviewed pertinent films in PACS CTA head and neck 7/8/2021  EKG, Pathology, and Other Studies: I have personally reviewed pertinent reports  and I have personally reviewed pertinent films in PACS EKG 7/8/2021  VTE Prophylaxis: Sequential compression device (Venodyne)     Code Status: Level 1 - Full Code    Counseling / Coordination of Care  Total time spent today 30 minutes  Greater than 50% of total time was spent with the patient and / or family counseling and / or coordination of care   A description of the counseling / coordination of care: Discussing differential diagnosis with pt including vertigo, discussing proper medication use with pt, and discussing case with primary care team

## 2021-07-09 NOTE — INCIDENTAL FINDINGS
The following findings require follow up:  Radiographic finding   Finding: abnormal finding on CT lungs   Follow up required: with pulmonology and pcp    Follow up should be done within 1 week(s)    Please notify the following clinician to assist with the follow up:   Dr Ball

## 2021-07-09 NOTE — PLAN OF CARE
Problem: Potential for Falls  Goal: Patient will remain free of falls  Description: INTERVENTIONS:  - Educate patient/family on patient safety including physical limitations  - Instruct patient to call for assistance with activity   - Consult OT/PT to assist with strengthening/mobility   - Keep Call bell within reach  - Keep bed low and locked with side rails adjusted as appropriate  - Keep care items and personal belongings within reach  - Initiate and maintain comfort rounds  - Make Fall Risk Sign visible to staff  - Offer Toileting every 2 Hours, in advance of need  - Initiate/Maintain bedalarm  - Obtain necessary fall risk management equipment:   - Apply yellow socks and bracelet for high fall risk patients  - Consider moving patient to room near nurses station  Outcome: Progressing     Problem: PAIN - ADULT  Goal: Verbalizes/displays adequate comfort level or baseline comfort level  Description: Interventions:  - Encourage patient to monitor pain and request assistance  - Assess pain using appropriate pain scale  - Administer analgesics based on type and severity of pain and evaluate response  - Implement non-pharmacological measures as appropriate and evaluate response  - Consider cultural and social influences on pain and pain management  - Notify physician/advanced practitioner if interventions unsuccessful or patient reports new pain  Outcome: Progressing     Problem: INFECTION - ADULT  Goal: Absence or prevention of progression during hospitalization  Description: INTERVENTIONS:  - Assess and monitor for signs and symptoms of infection  - Monitor lab/diagnostic results  - Monitor all insertion sites, i e  indwelling lines, tubes, and drains  - Monitor endotracheal if appropriate and nasal secretions for changes in amount and color  - [place] appropriate cooling/warming therapies per order  - Administer medications as ordered  - Instruct and encourage patient and family to use good hand hygiene technique  - Identify and instruct in appropriate isolation precautions for identified infection/condition  Outcome: Progressing  Goal: Absence of fever/infection during neutropenic period  Description: INTERVENTIONS:  - Monitor WBC    Outcome: Progressing     Problem: SAFETY ADULT  Goal: Patient will remain free of falls  Description: INTERVENTIONS:  - Educate patient/family on patient safety including physical limitations  - Instruct patient to call for assistance with activity   - Consult OT/PT to assist with strengthening/mobility   - Keep Call bell within reach  - Keep bed low and locked with side rails adjusted as appropriate  - Keep care items and personal belongings within reach  - Initiate and maintain comfort rounds  - Make Fall Risk Sign visible to staff  - Offer Toileting every 2 Hours, in advance of need  - Initiate/Maintain bedalarm  - Obtain necessary fall risk management equipment:   - Apply yellow socks and bracelet for high fall risk patients  - Consider moving patient to room near nurses station  Outcome: Progressing  Goal: Maintain or return to baseline ADL function  Description: INTERVENTIONS:  -  Assess patient's ability to carry out ADLs; assess patient's baseline for ADL function and identify physical deficits which impact ability to perform ADLs (bathing, care of mouth/teeth, toileting, grooming, dressing, etc )  - Assess/evaluate cause of self-care deficits   - Assess range of motion  - Assess patient's mobility; develop plan if impaired  - Assess patient's need for assistive devices and provide as appropriate  - Encourage maximum independence but intervene and supervise when necessary  - Involve family in performance of ADLs  - Assess for home care needs following discharge   - Consider OT consult to assist with ADL evaluation and planning for discharge  - Provide patient education as appropriate  Outcome: Progressing  Goal: Maintains/Returns to pre admission functional level  Description: INTERVENTIONS:  - Perform BMAT or MOVE assessment daily    - Set and communicate daily mobility goal to care team and patient/family/caregiver  - Collaborate with rehabilitation services on mobility goals if consulted  - Perform Range of Motion 3 times a day  - Reposition patient every 3 hours  - Dangle patient 3 times a day  - Stand patient 3 times a day  - Ambulate patient 3 times a day  - Out of bed to chair 3 times a day   - Out of bed for meals 3 times a day  - Out of bed for toileting  - Record patient progress and toleration of activity level   Outcome: Progressing     Problem: DISCHARGE PLANNING  Goal: Discharge to home or other facility with appropriate resources  Description: INTERVENTIONS:  - Identify barriers to discharge w/patient and caregiver  - Arrange for needed discharge resources and transportation as appropriate  - Identify discharge learning needs (meds, wound care, etc )  - Arrange for interpretive services to assist at discharge as needed  - Refer to Case Management Department for coordinating discharge planning if the patient needs post-hospital services based on physician/advanced practitioner order or complex needs related to functional status, cognitive ability, or social support system  Outcome: Progressing     Problem: Knowledge Deficit  Goal: Patient/family/caregiver demonstrates understanding of disease process, treatment plan, medications, and discharge instructions  Description: Complete learning assessment and assess knowledge base    Interventions:  - Provide teaching at level of understanding  - Provide teaching via preferred learning methods  Outcome: Progressing     Problem: MOBILITY - ADULT  Goal: Maintain or return to baseline ADL function  Description: INTERVENTIONS:  -  Assess patient's ability to carry out ADLs; assess patient's baseline for ADL function and identify physical deficits which impact ability to perform ADLs (bathing, care of mouth/teeth, toileting, grooming, dressing, etc )  - Assess/evaluate cause of self-care deficits   - Assess range of motion  - Assess patient's mobility; develop plan if impaired  - Assess patient's need for assistive devices and provide as appropriate  - Encourage maximum independence but intervene and supervise when necessary  - Involve family in performance of ADLs  - Assess for home care needs following discharge   - Consider OT consult to assist with ADL evaluation and planning for discharge  - Provide patient education as appropriate  Outcome: Progressing  Goal: Maintains/Returns to pre admission functional level  Description: INTERVENTIONS:  - Perform BMAT or MOVE assessment daily    - Set and communicate daily mobility goal to care team and patient/family/caregiver  - Collaborate with rehabilitation services on mobility goals if consulted  - Perform Range of Motion 3 times a day  - Reposition patient every 3 hours    - Dangle patient 3 times a day  - Stand patient 3 times a day  - Ambulate patient 3 times a day  - Out of bed to chair 3 times a day   - Out of bed for meals 3 times a day  - Out of bed for toileting  - Record patient progress and toleration of activity level   Outcome: Progressing     Problem: Prexisting or High Potential for Compromised Skin Integrity  Goal: Skin integrity is maintained or improved  Description: INTERVENTIONS:  - Identify patients at risk for skin breakdown  - Assess and monitor skin integrity  - Assess and monitor nutrition and hydration status  - Monitor labs   - Assess for incontinence   - Turn and reposition patient  - Assist with mobility/ambulation  - Relieve pressure over bony prominences  - Avoid friction and shearing  - Provide appropriate hygiene as needed including keeping skin clean and dry  - Evaluate need for skin moisturizer/barrier cream  - Collaborate with interdisciplinary team   - Patient/family teaching  - Consider wound care consult   Outcome: Progressing

## 2021-07-09 NOTE — ASSESSMENT & PLAN NOTE
CTA head and neck  IMPRESSION:  CT brain: No acute intracranial abnormality    CT angiography: No stenosis, occlusion or thrombosis of the cervical or intracranial vasculature        Likely sec to vertigo vs panic attack  Discussed with Neurology recommendations appreciated  Patient will need close outpatient psychiatry follow-up after discharge

## 2021-07-09 NOTE — ASSESSMENT & PLAN NOTE
53 yo F with multiple medical comorbidities including but not limited to cervicalgia, wheelchair bound since 2009, NSTEMI s/p stenting in January 2021, Type 2 DM, anxiety/depression, presenting to [facility] on 7/8/21 due to dizziness  Pt was concerned about her dizziness because it is similar to how she felt when she experienced NSTEMI in January 2021  Pt denies having any dizziness today  Pt felt prn meclizine on 7/8/21 helped relieve her dizziness  DDx includes vertigo versus panic attack versus hypertensive urgency  Plan  · Continue meclizine 25 mg po q8h prn dizziness  · BP control per primary care team, goal normotension  · Pt may benefit from outpatient vestibular therapy   · Continue following with Dr John Mendez and Dr Radha Starr for cervicalgia  · Recommend utilizing Tempurpedic cervical pillow at home   · Fall precautions  · Encourage PO hydration   · No further inpatient neurological recommendations at this time     Workup   · Imaging  · CT brain and CTA head/neck 7/8/21: CT brain: No acute intracranial abnormality  CT angiography: No stenosis, occlusion or thrombosis of the cervical or intracranial vasculature    · EKG 7/8/21: Sinus rhythm with 1st degree A-V block, Cannot rule out Anterior infarct but no significant change compared to ECG of 4/12/2019  · Labs   · CBC, CMP, Troponin 7/8/21 unremarkable   · Lipid panel 7/9/21 unremarkable

## 2021-07-09 NOTE — NURSING NOTE
Pt discharged home, reviewed all discharge instructions  Aware of all followup appointments  Medications from home returned to pt  Removed iv heplock, removed masimo and put back on charging station  All personal belongings with pt    pt left via wheelchair with family and aid in no distress

## 2021-07-09 NOTE — PLAN OF CARE
Problem: Potential for Falls  Goal: Patient will remain free of falls  Description: INTERVENTIONS:  - Educate patient/family on patient safety including physical limitations  - Instruct patient to call for assistance with activity   - Consult OT/PT to assist with strengthening/mobility   - Keep Call bell within reach  - Keep bed low and locked with side rails adjusted as appropriate  - Keep care items and personal belongings within reach  - Initiate and maintain comfort rounds  - Make Fall Risk Sign visible to staff  - Offer Toileting every 2 Hours, in advance of need  - Initiate/Maintain alarm  - Obtain necessary fall risk management equipment:   - Apply yellow socks and bracelet for high fall risk patients  - Consider moving patient to room near nurses station  7/9/2021 1959 by Richa Allan RN  Outcome: Adequate for Discharge  7/9/2021 1420 by Richa Allan RN  Outcome: Progressing     Problem: PAIN - ADULT  Goal: Verbalizes/displays adequate comfort level or baseline comfort level  Description: Interventions:  - Encourage patient to monitor pain and request assistance  - Assess pain using appropriate pain scale  - Administer analgesics based on type and severity of pain and evaluate response  - Implement non-pharmacological measures as appropriate and evaluate response  - Consider cultural and social influences on pain and pain management  - Notify physician/advanced practitioner if interventions unsuccessful or patient reports new pain  7/9/2021 1959 by Richa Allan RN  Outcome: Adequate for Discharge  7/9/2021 1420 by Richa Allan RN  Outcome: Progressing     Problem: INFECTION - ADULT  Goal: Absence or prevention of progression during hospitalization  Description: INTERVENTIONS:  - Assess and monitor for signs and symptoms of infection  - Monitor lab/diagnostic results  - Monitor all insertion sites, i e  indwelling lines, tubes, and drains  - Monitor endotracheal if appropriate and nasal secretions for changes in amount and color  - [place] appropriate cooling/warming therapies per order  - Administer medications as ordered  - Instruct and encourage patient and family to use good hand hygiene technique  - Identify and instruct in appropriate isolation precautions for identified infection/condition  7/9/2021 1959 by Lázaro [place] RN  Outcome: Adequate for Discharge  7/9/2021 1420 by Lázaro [place] RN  Outcome: Progressing  Goal: Absence of fever/infection during neutropenic period  Description: INTERVENTIONS:  - Monitor WBC    7/9/2021 1959 by Lázaro [place] RN  Outcome: Adequate for Discharge  7/9/2021 1420 by Lázaro Gilbert RN  Outcome: Progressing     Problem: SAFETY ADULT  Goal: Patient will remain free of falls  Description: INTERVENTIONS:  - Educate patient/family on patient safety including physical limitations  - Instruct patient to call for assistance with activity   - Consult OT/PT to assist with strengthening/mobility   - Keep Call bell within reach  - Keep bed low and locked with side rails adjusted as appropriate  - Keep care items and personal belongings within reach  - Initiate and maintain comfort rounds  - Make Fall Risk Sign visible to staff  - Offer Toileting every  Hours, in advance of need  - Initiate/Maintain alarm  - Obtain necessary fall risk management equipment:   - Apply yellow socks and bracelet for high fall risk patients  - Consider moving patient to room near nurses station  7/9/2021 1959 by Lázaro [place] RN  Outcome: Adequate for Discharge  7/9/2021 1420 by Lázaro [place] RN  Outcome: Progressing  Goal: Maintain or return to baseline ADL function  Description: INTERVENTIONS:  -  Assess patient's ability to carry out ADLs; assess patient's baseline for ADL function and identify physical deficits which impact ability to perform ADLs (bathing, care of mouth/teeth, toileting, grooming, dressing, etc )  - Assess/evaluate cause of self-care deficits   - Assess range of motion  - Assess patient's mobility; develop plan if impaired  - Assess patient's need for assistive devices and provide as appropriate  - Encourage maximum independence but intervene and supervise when necessary  - Involve family in performance of ADLs  - Assess for home care needs following discharge   - Consider OT consult to assist with ADL evaluation and planning for discharge  - Provide patient education as appropriate  7/9/2021 1959 by Eunice Childress RN  Outcome: Adequate for Discharge  7/9/2021 1420 by Eunice Childress RN  Outcome: Progressing  Goal: Maintains/Returns to pre admission functional level  Description: INTERVENTIONS:  - Perform BMAT or MOVE assessment daily    - Set and communicate daily mobility goal to care team and patient/family/caregiver  - Collaborate with rehabilitation services on mobility goals if consulted  - Perform Range of Motion 3 times a day  - Reposition patient every 3 hours    - Dangle patient 3 times a day  - Stand patient 3 times a day  - Ambulate patient 3 times a day  - Out of bed to chair 3 times a day   - Out of bed for meals 3 times a day  - Out of bed for toileting  - Record patient progress and toleration of activity level   7/9/2021 1959 by Eunice Childress RN  Outcome: Adequate for Discharge  7/9/2021 1420 by Eunice Childress RN  Outcome: Progressing     Problem: DISCHARGE PLANNING  Goal: Discharge to home or other facility with appropriate resources  Description: INTERVENTIONS:  - Identify barriers to discharge w/patient and caregiver  - Arrange for needed discharge resources and transportation as appropriate  - Identify discharge learning needs (meds, wound care, etc )  - Arrange for interpretive services to assist at discharge as needed  - Refer to Case Management Department for coordinating discharge planning if the patient needs post-hospital services based on physician/advanced practitioner order or complex needs related to functional status, cognitive ability, or social support system  7/9/2021 1959 by Kike Majano RN  Outcome: Adequate for Discharge  7/9/2021 1420 by Kike Majano RN  Outcome: Progressing     Problem: Knowledge Deficit  Goal: Patient/family/caregiver demonstrates understanding of disease process, treatment plan, medications, and discharge instructions  Description: Complete learning assessment and assess knowledge base  Interventions:  - Provide teaching at level of understanding  - Provide teaching via preferred learning methods  7/9/2021 1959 by Kike Majano RN  Outcome: Adequate for Discharge  7/9/2021 1420 by Kike Majano RN  Outcome: Progressing     Problem: MOBILITY - ADULT  Goal: Maintain or return to baseline ADL function  Description: INTERVENTIONS:  -  Assess patient's ability to carry out ADLs; assess patient's baseline for ADL function and identify physical deficits which impact ability to perform ADLs (bathing, care of mouth/teeth, toileting, grooming, dressing, etc )  - Assess/evaluate cause of self-care deficits   - Assess range of motion  - Assess patient's mobility; develop plan if impaired  - Assess patient's need for assistive devices and provide as appropriate  - Encourage maximum independence but intervene and supervise when necessary  - Involve family in performance of ADLs  - Assess for home care needs following discharge   - Consider OT consult to assist with ADL evaluation and planning for discharge  - Provide patient education as appropriate  7/9/2021 1959 by Kike Majano RN  Outcome: Adequate for Discharge  7/9/2021 1420 by Kike Majano RN  Outcome: Progressing  Goal: Maintains/Returns to pre admission functional level  Description: INTERVENTIONS:  - Perform BMAT or MOVE assessment daily    - Set and communicate daily mobility goal to care team and patient/family/caregiver     - Collaborate with rehabilitation services on mobility goals if consulted  - Perform Range of Motion 3 times a day  - Reposition patient every 3 hours    - Dangle patient 3 times a day  - Stand patient 3 times a day  - Ambulate patient 3 times a day  - Out of bed to chair 3 times a day   - Out of bed for meals 3 times a day  - Out of bed for toileting  - Record patient progress and toleration of activity level   7/9/2021 1959 by Hailee Dixon RN  Outcome: Adequate for Discharge  7/9/2021 1420 by Hailee Dixon RN  Outcome: Progressing     Problem: Prexisting or High Potential for Compromised Skin Integrity  Goal: Skin integrity is maintained or improved  Description: INTERVENTIONS:  - Identify patients at risk for skin breakdown  - Assess and monitor skin integrity  - Assess and monitor nutrition and hydration status  - Monitor labs   - Assess for incontinence   - Turn and reposition patient  - Assist with mobility/ambulation  - Relieve pressure over bony prominences  - Avoid friction and shearing  - Provide appropriate hygiene as needed including keeping skin clean and dry  - Evaluate need for skin moisturizer/barrier cream  - Collaborate with interdisciplinary team   - Patient/family teaching  - Consider wound care consult   7/9/2021 1959 by Hailee Dixon RN  Outcome: Adequate for Discharge  7/9/2021 1420 by Hailee Dixon RN  Outcome: Progressing

## 2021-07-12 ENCOUNTER — TELEPHONE (OUTPATIENT)
Dept: PULMONOLOGY | Facility: CLINIC | Age: 56
End: 2021-07-12

## 2021-07-12 NOTE — TELEPHONE ENCOUNTER
This patient is calling in for a new patient appointment for abnormal findings on ct scan  She was just recently discharged from hospital   I do not see where any of our providers saw her  Can you please advise where to put her on the schedule? Thank you

## 2021-07-12 NOTE — TELEPHONE ENCOUNTER
DR Nino Haskins CAN YOU PLEASE REVIEW THE CT CHEST REPORT, PLEASE LET ME KNOW WHEN CAN I SCHEDULE THIS PATIENT, [place]

## 2021-07-14 ENCOUNTER — TELEPHONE (OUTPATIENT)
Dept: PULMONOLOGY | Facility: CLINIC | Age: 56
End: 2021-07-14

## 2021-07-14 NOTE — TELEPHONE ENCOUNTER
Patient is calling back to see if you got a chance to look at her ct and your schedule to see where we can put her  Please advise  Thank you

## 2021-07-22 ENCOUNTER — OFFICE VISIT (OUTPATIENT)
Dept: PULMONOLOGY | Facility: CLINIC | Age: 56
End: 2021-07-22
Payer: MEDICARE

## 2021-07-22 VITALS
WEIGHT: 293 LBS | SYSTOLIC BLOOD PRESSURE: 138 MMHG | DIASTOLIC BLOOD PRESSURE: 76 MMHG | HEART RATE: 73 BPM | HEIGHT: 67 IN | OXYGEN SATURATION: 97 % | BODY MASS INDEX: 45.99 KG/M2 | TEMPERATURE: 98.1 F

## 2021-07-22 DIAGNOSIS — E66.01 OBESITY, MORBID (HCC): ICD-10-CM

## 2021-07-22 DIAGNOSIS — J45.20 MILD INTERMITTENT ASTHMA WITHOUT COMPLICATION: ICD-10-CM

## 2021-07-22 DIAGNOSIS — R91.8 PULMONARY NODULES: Primary | ICD-10-CM

## 2021-07-22 PROCEDURE — 99204 OFFICE O/P NEW MOD 45 MIN: CPT | Performed by: INTERNAL MEDICINE
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
## 2021-10-07 ENCOUNTER — TELEPHONE (OUTPATIENT)
Dept: OBGYN CLINIC | Facility: HOSPITAL | Age: 56
End: 2021-10-07

## 2021-10-09 DIAGNOSIS — M25.551 RIGHT HIP PAIN: Primary | ICD-10-CM

## 2021-11-02 ENCOUNTER — OFFICE VISIT (OUTPATIENT)
Dept: OBGYN CLINIC | Facility: CLINIC | Age: 56
End: 2021-11-02
Payer: MEDICARE

## 2021-11-02 ENCOUNTER — APPOINTMENT (OUTPATIENT)
Dept: RADIOLOGY | Facility: CLINIC | Age: 56
End: 2021-11-02
Payer: MEDICARE

## 2021-11-02 VITALS
HEIGHT: 67 IN | DIASTOLIC BLOOD PRESSURE: 84 MMHG | HEART RATE: 79 BPM | WEIGHT: 293 LBS | SYSTOLIC BLOOD PRESSURE: 132 MMHG | BODY MASS INDEX: 45.99 KG/M2

## 2021-11-02 DIAGNOSIS — M25.552 BILATERAL HIP PAIN: Primary | ICD-10-CM

## 2021-11-02 DIAGNOSIS — M25.551 RIGHT HIP PAIN: ICD-10-CM

## 2021-11-02 DIAGNOSIS — M25.551 BILATERAL HIP PAIN: Primary | ICD-10-CM

## 2021-11-02 PROCEDURE — 73522 X-RAY EXAM HIPS BI 3-4 VIEWS: CPT

## 2021-11-02 PROCEDURE — 99213 OFFICE O/P EST LOW 20 MIN: CPT | Performed by: ORTHOPAEDIC SURGERY

## 2021-11-03 PROBLEM — M25.551 BILATERAL HIP PAIN: Status: ACTIVE | Noted: 2021-11-03

## 2021-11-03 PROBLEM — M25.552 BILATERAL HIP PAIN: Status: ACTIVE | Noted: 2021-11-03

## 2021-11-12 ENCOUNTER — TELEPHONE (OUTPATIENT)
Dept: PULMONOLOGY | Facility: CLINIC | Age: 56
End: 2021-11-12

## 2021-12-21 DIAGNOSIS — M25.552 BILATERAL HIP PAIN: Primary | ICD-10-CM

## 2021-12-21 DIAGNOSIS — M25.551 BILATERAL HIP PAIN: Primary | ICD-10-CM

## 2021-12-22 ENCOUNTER — TELEPHONE (OUTPATIENT)
Dept: OBGYN CLINIC | Facility: HOSPITAL | Age: 56
End: 2021-12-22

## 2022-02-02 DIAGNOSIS — M25.552 BILATERAL HIP PAIN: Primary | ICD-10-CM

## 2022-02-02 DIAGNOSIS — M25.551 BILATERAL HIP PAIN: Primary | ICD-10-CM

## 2022-03-17 ENCOUNTER — TELEPHONE (OUTPATIENT)
Dept: OBGYN CLINIC | Facility: HOSPITAL | Age: 57
End: 2022-03-17

## 2022-03-17 NOTE — TELEPHONE ENCOUNTER
FYI-  Patient was a no show for the wheel chair clinic at [facility] and is refusing reschedule

## 2022-03-21 NOTE — TELEPHONE ENCOUNTER
Patient sees Dr Thomas Vaughn at [facility] is requesting a call about the wheel chair  He wants to speak to the office about this situation and understand what happened      Seth call back number is [phone]

## 2022-04-19 ENCOUNTER — TELEPHONE (OUTPATIENT)
Dept: HEMATOLOGY ONCOLOGY | Facility: CLINIC | Age: 57
End: 2022-04-19

## 2022-04-19 NOTE — TELEPHONE ENCOUNTER
New Patient Intake Form   Patient Details:    Susi Taylorr  1965    Appointment Information   Who is calling to schedule? Patient   If not self, what is the caller's name? Please put name of RBC nurse as well  DID YOU CONFIRM INSURANCE WITH PATIENT? Yes    Referring provider Self referral    What is the diagnosis? Left renal mass     Is there a confirmed tissue diagnosis?   no   Is there a biopsy ordered or pending? Please specify dates   n/a     Is patient aware of diagnosis? Yes   Have you had any imaging or labs done? If yes, where? (If imaging done outside of [facility], please remind patient to bring a disk ) Yes     02/28     [address], [address]   [phone]       If imaging done at outside facility, did you instruct patient to obtain discs and bring to visit? Yes    Have you been seen by another Oncologist/Hematologist?  If so, who and where? no   Are the records in [facility] or Care Everywhere? yes   Does the patient have records at another facility/hospital? Yes        If yes, Name of facility, city and state where facility is located  Did you instruct patient to have records faxed to rightfax and provide rightfax number?      (Yes) [address], [facility]   Madison Vaughn 37 [phone]     Preferred [place]   Is the patient willing to be seen by another provider? (This is for breast patients only) n/a     Did you send new patient paperwork? Email or mail?  Yes    Miscellaneous Information:    I have advised patient to mail disc over to [address] Room  appt made for 05/03

## 2022-04-29 ENCOUNTER — TELEPHONE (OUTPATIENT)
Dept: OBGYN CLINIC | Facility: HOSPITAL | Age: 57
End: 2022-04-29

## 2022-04-29 NOTE — TELEPHONE ENCOUNTER
Pt sees Dr Thom Fonseca from [facility] is calling stating that the pt has a wheelchair eval on 5-5  and they need the referrals      FAX#[phone]  CB# [phone]

## 2022-05-03 ENCOUNTER — CONSULT (OUTPATIENT)
Dept: HEMATOLOGY ONCOLOGY | Facility: CLINIC | Age: 57
End: 2022-05-03
Payer: MEDICARE

## 2022-05-03 VITALS
SYSTOLIC BLOOD PRESSURE: 142 MMHG | HEART RATE: 63 BPM | OXYGEN SATURATION: 98 % | RESPIRATION RATE: 18 BRPM | DIASTOLIC BLOOD PRESSURE: 90 MMHG | TEMPERATURE: 97.2 F

## 2022-05-03 DIAGNOSIS — I82.462 ACUTE DEEP VEIN THROMBOSIS (DVT) OF CALF MUSCLE VEIN OF LEFT LOWER EXTREMITY (HCC): ICD-10-CM

## 2022-05-03 DIAGNOSIS — C64.2 CLEAR CELL RENAL CELL CARCINOMA, LEFT (HCC): Primary | ICD-10-CM

## 2022-05-03 DIAGNOSIS — Z98.890 STATUS POST CRYOABLATION: ICD-10-CM

## 2022-05-03 PROCEDURE — 99205 OFFICE O/P NEW HI 60 MIN: CPT | Performed by: INTERNAL MEDICINE

## 2022-05-03 NOTE — PROGRESS NOTES
[facility] - Hematology & Medical Oncology  Outpatient Visit Encounter Note      Christoph Muniz 64 y o  female [id number] Date:  5/3/2022      SUBJECTIVE      Christoph Muniz is a 64 y o  here for new consultation with me today  She comes in with a left renal mass  In review of the chart and talking with the patient, she was diagnosed with acute left calf vein DVT on 7/28/21 for which she underwent DOAC therapy  This was her first lifetime episode of VTE  Her CTA chest was negative for PE  It is unclear if this was provoked or not  On 11/29/21 she had CT chest done for lung nodule follow-up  This showed suspected 2 7 cm solid lesion involving the   posterior upper cortex of the left kidney slightly exophytic in nature  A dedicated CT abdomen done 12/1/21 showed Enhancing solid mass at the upper pole of the left kidney is suspicious for   primary renal cell cancer  She thereafter underwent cryoablation of a left solid renal lesion at [facility] on 2/2/22  A scan on 2/28/22 showed 4 2 cm post cryoablation defect in the left upper renal pole  There  is patchy enhancement which may be secondary to inflammation of residual tumor  Recommend continued follow-up  Biopsy from 2/3/22 showed:  ADDENDUM       Addendum Diagnosis   Immunostains with antibodies to carbonic anhydrase shows strong membranous staining in clear cells while staining with antibodies to CK7 is negative  Staining with antibodies to vimentin and AE1/AE3 shows strong cytoplasmic staining while staining with antibodies to PAX-8 shows strong nuclear staining in clear cells   Overall staining pattern speaks in favor of clear cell type renal cell carcinoma  Date Ordered: 2/3/2022   Date Reported: 2/3/2022   Pathologist: MIKEY Mckeon  Electronically Released by: MIKEY Mckeon  Electronically Released at: [address]   [facility], [address] [place] [address]   : Vasiliy Grove Vasiliy Arteaga MD           DIAGNOSIS  :   A  LEFT KIDNEY, BIOPSY OF MASS:   Core biopsy showing edematous scar tissue with multiple siderophages   probably area of previous organized hemorrhage surrounded by rim of   clear cells with relatively uniform nuclei highly suggestive of clear   cell type renal cell carcinoma  Immunostains to confirm diagnosis of clear cell renal cell carcinoma   will be ordered and the results will be reported in a separate   addendum  Electronically Signed Out by MIKEY Baptiste D  Denies any f/c/n/v/cp/ap/sob/cough  Denies diarrhea or skin rash  She has chronic health issues  She is on DOAC  I have reviewed the relevant past medical, surgical, social and family history  I have also reviewed allergies and medications for this patient  Review of Systems  Review of Systems   All other systems reviewed and are negative  OBJECTIVE     Physical Exam  Vitals:    05/03/22 1105   BP: 142/90   BP Location: Left arm   Patient Position: Sitting   Cuff Size: Adult   Pulse: 63   Resp: 18   Temp: (!) 97 2 °F (36 2 °C)   SpO2: 98%       Physical Exam  Vitals reviewed  Constitutional:       General: She is not in acute distress  Appearance: She is obese  She is not ill-appearing, toxic-appearing or diaphoretic  HENT:      Head: Normocephalic and atraumatic  Eyes:      General: No scleral icterus  Extraocular Movements: Extraocular movements intact  Cardiovascular:      Rate and Rhythm: Normal rate  Pulmonary:      Effort: Pulmonary effort is normal  No respiratory distress  Abdominal:      General: There is no distension  Musculoskeletal:         General: Normal range of motion  Cervical back: Normal range of motion and neck supple  Neurological:      General: No focal deficit present  Mental Status: She is alert and oriented to person, place, and time        Comments: wheelchair          Imaging  Relevant imaging reviewed in chart    Labs  Relevant labs reviewed in chart   ASSESSMENT & PLAN      Diagnosis ICD-10-CM Associated Orders   1  Clear cell renal cell carcinoma, left (HCC)  C64 2    2  Status post cryoablation  Z98 890    3  BMI 45 0-49 9, adult ([facility] [address] )  Z68 42    4  Acute deep vein thrombosis (DVT) of calf muscle vein of left lower extremity [facility])  I82 462          64 y o  female diagnosed with left renal mass on CT imaging on 12/1/21 during pulmonary nodule monitoring imaging  Thereafter, she underwent biopsy and cryoablation with [facility] IR on 2/1/22 for a described 2 9cm left kidney upper pole hypoechoic mass with vascularity  This happened after she had discussion with IR there where the patient preferred non-surgical options for management due to her history of multiple surgeries and co-morbidities  Her biopsy taken the same day showed clear cell carcinoma  · Discussion  · Clear RCC  · At this time, after undergoing primary management with cryoablation, a short term CT abdomen on 2/28 showed cryoablation defect with patchy enhancement that could be secondary to inflammation or residual tumor  · Given the short-term imaging from her primary treatment, I recommend she gets short term imaging to follow-up on this  She told me that she has this planned with her IR doctor there for imaging in June 2022  · Since there is a managing physician for this [facility] and no medical oncology services are currently required, she can see me at [facility]  I told her that if her IR doctor determines that she needs systemic therapy, then he can guide her to see me in the office  · DVT in LLE (7/28/21)  · Unclear if provoked or not, but she is on this medication per her cardiologist given several co-morbid factors and cardiac reasons per his note  · Being managed by cardiologist      Follow Up   [facility]N      All questions were answered to the patient's satisfaction during this encounter  They appreciated and thanked me for spending time with them   The patient knows the contact information for our office and know to reach out for any relevant concerns related to this encounter  For all other listed problems and medical diagnosis in his chart - they are managed by PCP and/or other specialists, which patient acknowledges  Dr Young Adhikari MD  Hematology & Medical Oncology

## 2022-05-05 ENCOUNTER — PATIENT OUTREACH (OUTPATIENT)
Dept: CASE MANAGEMENT | Facility: HOSPITAL | Age: 57
End: 2022-05-05

## 2022-05-05 NOTE — PROGRESS NOTES
MSW s/w pt by phone this morning to review her DT, completed at [facility], which she self scored as 8/10 and indicated concerns including insurance/finances, transportation, tx decisions, dealing with children, family health, depression, fear, sadness, nervousness, worry, and loss of interest in activities  Pt was notably angry and upset during our conversation today  She shared that she feels she was misdiagnosed with a cyst on her kidney 6+ months ago, that she now is told is cancer  Review of her visit with Dr Taqueria Alicia shows that there is no need for tx at this time and she can come back to see him based on the next round of imaging if necessary  She then shared that she has used a wheelchair for the last 13 years and has been trying to get a new wheelchair for herself for several months, but can't seem to get one delivered  She says that her ortho office did put in the order, but did not complete any of the follow up paperwork  She then attempted to go through her PCP office instead but says that she "keeps getting the run around" and that her PCP office told the DME company that she is no longer a pt in their practice  She says that she can't understand why they would say that, when she has a scheduled appt with them next week  I offered to assist with the wheelchair situation and she declined repeatedly  Pt also shared that her financial situation is strained  She has a car that she is still paying off, however it continuously has mechanical problems that she has to have fixed  She lives on disability income of $800 a month  She lives with her daughter and son in law, however they have to help her financially often because her income is so low  I suggested that she apply for SSI to provide her some additional income, and she says that she already has applied and was denied, as well as emergency cash assistance    I apologized that she's having so many issues all at once and validated her frustration with everything going on  She thanked me for calling but repeatedly refused any assistance moving forward  MSW will remain available to her should she change her mind in the future, no other needs at this time

## 2022-06-13 ENCOUNTER — TELEPHONE (OUTPATIENT)
Dept: OBGYN CLINIC | Facility: HOSPITAL | Age: 57
End: 2022-06-13

## 2022-06-13 NOTE — TELEPHONE ENCOUNTER
Patient is seen by Dr Margaret Banuelos  [facility] sent forms regarding wheelchair and seating evaluation  Forms were  scanned in on 6/9 to have Dr Margaret Banuelos sign and fax back to : [phone]  Please call patient to confirm it was sent     [phone]

## 2022-06-21 ENCOUNTER — TELEPHONE (OUTPATIENT)
Dept: OBGYN CLINIC | Facility: MEDICAL CENTER | Age: 57
End: 2022-06-21

## 2022-06-21 NOTE — TELEPHONE ENCOUNTER
Patient sees Dr Westly Goldmann Marva Lis at [facility] for office notes requesting medically necessity for [employer]     Faxed to [phone]

## 2022-06-21 NOTE — TELEPHONE ENCOUNTER
Can this form please be re-faxed? Company told patient they never received the form the first time it was sent         Fax # [phone]    Patient CB # [phone]

## 2022-08-05 ENCOUNTER — EMERGENCY (EMERGENCY)
Facility: HOSPITAL | Age: 57
LOS: 1 days | Discharge: HOME | End: 2022-08-05
Attending: EMERGENCY MEDICINE | Admitting: EMERGENCY MEDICINE

## 2022-08-05 VITALS
SYSTOLIC BLOOD PRESSURE: 161 MMHG | RESPIRATION RATE: 18 BRPM | OXYGEN SATURATION: 98 % | WEIGHT: 293 LBS | HEART RATE: 100 BPM | DIASTOLIC BLOOD PRESSURE: 97 MMHG | TEMPERATURE: 98 F

## 2022-08-05 DIAGNOSIS — Z96.651 PRESENCE OF RIGHT ARTIFICIAL KNEE JOINT: Chronic | ICD-10-CM

## 2022-08-05 DIAGNOSIS — Z96.642 PRESENCE OF LEFT ARTIFICIAL HIP JOINT: Chronic | ICD-10-CM

## 2022-08-05 DIAGNOSIS — Z96.641 PRESENCE OF RIGHT ARTIFICIAL HIP JOINT: Chronic | ICD-10-CM

## 2022-08-05 PROCEDURE — 73130 X-RAY EXAM OF HAND: CPT | Mod: 26,LT

## 2022-08-05 PROCEDURE — 73590 X-RAY EXAM OF LOWER LEG: CPT | Mod: 26,RT

## 2022-08-05 PROCEDURE — 73564 X-RAY EXAM KNEE 4 OR MORE: CPT | Mod: 26,RT

## 2022-08-05 PROCEDURE — 73552 X-RAY EXAM OF FEMUR 2/>: CPT | Mod: 26,RT

## 2022-08-05 PROCEDURE — 99284 EMERGENCY DEPT VISIT MOD MDM: CPT

## 2022-08-05 PROCEDURE — 73110 X-RAY EXAM OF WRIST: CPT | Mod: 26,LT

## 2022-08-05 NOTE — ED PROVIDER NOTE - NSICDXPASTSURGICALHX_GEN_ALL_CORE_FT
PAST SURGICAL HISTORY:  History of right hip replacement     S/P hip replacement, left     S/P total knee replacement, right

## 2022-08-05 NOTE — ED PROVIDER NOTE - PATIENT PORTAL LINK FT
You can access the FollowMyHealth Patient Portal offered by [facility] by registering at the following website: [email or web address]. By joining [employer]’s FollowMyHealth portal, you will also be able to view your health information using other applications (apps) compatible with our system.
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
## 2022-08-05 NOTE — ED PROVIDER NOTE - NSFOLLOWUPINSTRUCTIONS_ED_ALL_ED_FT
You were evaluated today after a fall. Your x-rays did not show any acute injuries. You likely suffered a muscle contusion    Follow up with your primary care doctor in 1 week for further evaluation of your symptoms    Please take over the counter pain medications such as Tylenol (650mg every 4 hours) for pain    Return to the Emergency Department if you have any new or worsening symptoms

## 2022-08-05 NOTE — ED PROVIDER NOTE - NSICDXPASTMEDICALHX_GEN_ALL_CORE_FT
PAST MEDICAL HISTORY:  DM (diabetes mellitus)     HLD (hyperlipidemia)     HTN (hypertension)     Pulmonary embolism     Renal cell carcinoma
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
## 2022-08-05 NOTE — ED PROVIDER NOTE - PHYSICAL EXAMINATION
Physical Exam:  Gen: awake alert   Head: NCAT  HEENT: normal conjunctiva, oral mucosa moist  Lung: CTAB  CV: RRR  Abd: soft, NT, ND, no guarding, no rigidity, no rebound tenderness  MSK: no visible deformities, TTP of inferior R knee joint at patellar tendon, TTP of proximal palmar aspect of L hand, TTP of wrist  Neuro: No focal sensory deficits  Skin: Warm, well perfused, abrasions over R knee, no leg swelling  ~Kirby Schmidt MD (PGY-3)

## 2022-08-05 NOTE — ED PROVIDER NOTE - PROGRESS NOTE DETAILS
Brayan COLORADO (PGY-3)  xr's w/o acute fx or dislocation. pt otherwise has no new complaints. will d/c to home with pcp f/u

## 2022-08-05 NOTE — ED ADULT NURSE NOTE - CAS ELECT INFOMATION PROVIDED
offered paper scrub bottoms- refused/DC instructions offered paper scrub bottoms- refused/ refused vitals/DC instructions
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
## 2022-08-05 NOTE — ED PROVIDER NOTE - NS ED ROS FT
CONST: no fevers, no chills  EYES: no vision changes  ENT: no sore throat  CV: no chest pain, no leg swelling  RESP: no shortness of breath, no cough  ABD: no abdominal pain, no nausea, no vomiting, no diarrhea  : no dysuria, no flank pain, no hematuria  MSK: no back pain, +extremity pain  NEURO: no headache or additional neurologic complaints  SKIN:  no rash

## 2022-08-07 DIAGNOSIS — M25.561 PAIN IN RIGHT KNEE: ICD-10-CM

## 2022-08-07 DIAGNOSIS — Z88.6 ALLERGY STATUS TO ANALGESIC AGENT: ICD-10-CM

## 2022-08-07 DIAGNOSIS — Z88.1 ALLERGY STATUS TO OTHER ANTIBIOTIC AGENTS STATUS: ICD-10-CM

## 2022-08-07 DIAGNOSIS — I10 ESSENTIAL (PRIMARY) HYPERTENSION: ICD-10-CM

## 2022-08-07 DIAGNOSIS — E11.9 TYPE 2 DIABETES MELLITUS WITHOUT COMPLICATIONS: ICD-10-CM
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
## 2022-08-07 DIAGNOSIS — Z85.528 PERSONAL HISTORY OF OTHER MALIGNANT NEOPLASM OF KIDNEY: ICD-10-CM

## 2022-08-07 DIAGNOSIS — E78.5 HYPERLIPIDEMIA, UNSPECIFIED: ICD-10-CM

## 2022-08-07 DIAGNOSIS — Z99.3 DEPENDENCE ON WHEELCHAIR: ICD-10-CM

## 2022-08-07 DIAGNOSIS — Z79.4 LONG TERM (CURRENT) USE OF INSULIN: ICD-10-CM

## 2022-08-07 DIAGNOSIS — Z86.711 PERSONAL HISTORY OF PULMONARY EMBOLISM: ICD-10-CM

## 2022-08-07 DIAGNOSIS — C64.2 MALIGNANT NEOPLASM OF LEFT KIDNEY, EXCEPT RENAL PELVIS: ICD-10-CM
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
## 2022-08-07 DIAGNOSIS — Z96.651 PRESENCE OF RIGHT ARTIFICIAL KNEE JOINT: ICD-10-CM

## 2022-08-07 DIAGNOSIS — Z96.643 PRESENCE OF ARTIFICIAL HIP JOINT, BILATERAL: ICD-10-CM

## 2022-08-07 DIAGNOSIS — T14.8XXA OTHER INJURY OF UNSPECIFIED BODY REGION, INITIAL ENCOUNTER: ICD-10-CM

## 2022-08-07 DIAGNOSIS — S80.211A ABRASION, RIGHT KNEE, INITIAL ENCOUNTER: ICD-10-CM

## 2022-08-07 DIAGNOSIS — Z79.01 LONG TERM (CURRENT) USE OF ANTICOAGULANTS: ICD-10-CM

## 2022-08-24 ENCOUNTER — TELEPHONE (OUTPATIENT)
Dept: OBGYN CLINIC | Facility: CLINIC | Age: 57
End: 2022-08-24

## 2022-08-24 NOTE — TELEPHONE ENCOUNTER
Dr Rip Stewart from [facility] called to request a new script for Omar's  Please include scooter delivery and final fitting    Please fax to [phone] or call [place] with any questions [phone]  Thank you

## 2022-09-26 ENCOUNTER — TELEPHONE (OUTPATIENT)
Dept: HEMATOLOGY ONCOLOGY | Facility: CLINIC | Age: 57
End: 2022-09-26

## 2022-09-26 NOTE — TELEPHONE ENCOUNTER
Patient calling to schedule follow up appt with Dr Trice Rush  Call disconnected before I could schedule patient

## 2022-09-26 NOTE — TELEPHONE ENCOUNTER
Scheduling Appointment SEND TO [facility]    Who Is Calling to Schedule Patient    Doctor Malou Hodge   Location [facility]   Date and Time 10/05 at 3:00pm   Reason for scheduling appointment ALIYA from [address]    Patient verbalized understanding    yes

## 2022-10-03 ENCOUNTER — TELEPHONE (OUTPATIENT)
Dept: HEMATOLOGY ONCOLOGY | Facility: CLINIC | Age: 57
End: 2022-10-03

## 2022-10-03 NOTE — TELEPHONE ENCOUNTER
Spoke to patient, she is insisting on being seen for follow up  Patient is NOT being treated by [facility], she is stating they refuse to treat her  Informed patient of follow up appointment with Dr Ricky Coffey, she is aware and agreeable to date/time

## 2022-10-03 NOTE — TELEPHONE ENCOUNTER
Call out to patient in regards to appointment on Wednesday with Samreen Morgan, patient informed provider does not treat her DX  Offered reschedule with Kathy Valentine or another provider  At this point patient became upset that she feels every doctor is pushing off her results saying she is fine  Patient had new imaging done at outside facility that is not viewable in epic currently  Will be attempting to get results     Patient currently only has cd  Patient aware Wednesday appointment is being canceled with piper carmen and will follow up with Dr Paula Sheppard

## 2022-10-05 ENCOUNTER — TELEPHONE (OUTPATIENT)
Dept: HEMATOLOGY ONCOLOGY | Facility: CLINIC | Age: 57
End: 2022-10-05

## 2022-10-05 NOTE — TELEPHONE ENCOUNTER
Appointment Cancellation Or Reschedule     Person calling in Patient    Provider GRETCHEN PLATT [facility]   Office Visit Date and Time  10/5/22 1pm    Office Visit Location [facility]   Did patient want to reschedule their office appointment? If so, when was it scheduled to? Yes, 10/6/22 3pm   Did you have STAR scheduled for this appointment? no   Do you need STAR set up for your new appointment? If yes, please send to "PATIENT RIDESHARE" pool for STAR rescheduling no   If you are cancelling appointment, can we notify STAR to cancel ride? If yes, please send to "PATIENT RIDESHARE" pool for STAR to cancel service no   Is this patient calling to reschedule an infusion appointment? no   When is their next infusion appointment? no   Is this patient a Chemo patient? no   Reason for Cancellation or Reschedule Patient car was not starting  I advised no appt with Job Brandon till November  She requested to be seen by someone else and soon  Put her with GRETCHEN BROWN [facility]     If the patient is a treatment patient, please route this to the office nurse  If the patient is not on treatment, please route to the office MA  If the patient is a surgical oncology patient, please route to surg/onc clinical pool

## 2022-10-05 NOTE — TELEPHONE ENCOUNTER
Called patient and advised her that [facility] does NOT see Renal Cell Carcinoma  This was explained to her previously by Randolph Ambrocio< MA     I advised patient she needed to be seen by a medical doctor and scheduled her for the next available which was with Dr Shubham Samuel on 10/18 at 2pm as patient wanted any campus  Patient voiced understanding  If patient calls back to change appointment she MUST be scheduled with a MEDICAL DOCTOR

## 2022-10-05 NOTE — TELEPHONE ENCOUNTER
Appointment Cancellation Or Reschedule     Person calling in Patient    Provider Pat Salcido   Office Visit Date and Time  10/6/22   Office Visit Location [facility]   Did patient want to reschedule their office appointment? If so, when was it scheduled to? Yes 10/10/22   Did you have STAR scheduled for this appointment? no   Do you need STAR set up for your new appointment? If yes, please send to "PATIENT RIDESHARE" pool for STAR rescheduling no   If you are cancelling appointment, can we notify STAR to cancel ride? If yes, please send to "PATIENT RIDESHARE" pool for STAR to cancel service no   Is this patient calling to reschedule an infusion appointment? no   When is their next infusion appointment? no   Is this patient a Chemo patient? no   Reason for Cancellation or Reschedule Received message from Radha Drake she is unable to see the patient  Gretchen appt for [facility]      If the patient is a treatment patient, please route this to the office nurse  If the patient is not on treatment, please route to the office MA  If the patient is a surgical oncology patient, please route to surg/onc clinical pool

## 2022-10-06 ENCOUNTER — TELEPHONE (OUTPATIENT)
Dept: HEMATOLOGY ONCOLOGY | Facility: CLINIC | Age: 57
End: 2022-10-06

## 2022-10-06 NOTE — TELEPHONE ENCOUNTER
Appointment Confirmation (to confirm pre existing appointments - ONLY)  No need to route   Appointment with  Dr Shuhbam Samuel   Appointment date & time 10/18 at 2pm   Location [facility]   Patient verbilized Understanding  yes

## 2022-10-18 ENCOUNTER — OFFICE VISIT (OUTPATIENT)
Dept: HEMATOLOGY ONCOLOGY | Facility: CLINIC | Age: 57
End: 2022-10-18
Payer: MEDICARE

## 2022-10-18 VITALS
DIASTOLIC BLOOD PRESSURE: 80 MMHG | OXYGEN SATURATION: 94 % | HEART RATE: 66 BPM | SYSTOLIC BLOOD PRESSURE: 134 MMHG | TEMPERATURE: 98.2 F | RESPIRATION RATE: 16 BRPM | HEIGHT: 67 IN | BODY MASS INDEX: 49.02 KG/M2

## 2022-10-18 DIAGNOSIS — M51.36 DEGENERATION OF INTERVERTEBRAL DISC OF LUMBAR REGION: ICD-10-CM

## 2022-10-18 DIAGNOSIS — F32.A ANXIETY AND DEPRESSION: ICD-10-CM

## 2022-10-18 DIAGNOSIS — E66.01 OBESITY, MORBID (HCC): ICD-10-CM

## 2022-10-18 DIAGNOSIS — I82.462 ACUTE DEEP VEIN THROMBOSIS (DVT) OF CALF MUSCLE VEIN OF LEFT LOWER EXTREMITY (HCC): Primary | ICD-10-CM

## 2022-10-18 DIAGNOSIS — F41.9 ANXIETY AND DEPRESSION: ICD-10-CM

## 2022-10-18 DIAGNOSIS — C64.2 CLEAR CELL RENAL CELL CARCINOMA, LEFT (HCC): ICD-10-CM

## 2022-10-18 PROCEDURE — 99205 OFFICE O/P NEW HI 60 MIN: CPT | Performed by: INTERNAL MEDICINE

## 2022-10-18 PROCEDURE — 99215 OFFICE O/P EST HI 40 MIN: CPT | Performed by: INTERNAL MEDICINE
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
## 2022-10-19 ENCOUNTER — TELEPHONE (OUTPATIENT)
Dept: HEMATOLOGY ONCOLOGY | Facility: MEDICAL CENTER | Age: 57
End: 2022-10-19

## 2022-10-19 NOTE — TELEPHONE ENCOUNTER
Spoke with patient  Patient has elected to continue care with Dr Humberto Maria  Patient has f/u in one month at which time surveillance of condition will be discussed  Patient verbalizes understanding of plan

## 2022-10-31 ENCOUNTER — TELEPHONE (OUTPATIENT)
Dept: HEMATOLOGY ONCOLOGY | Facility: MEDICAL CENTER | Age: 57
End: 2022-10-31

## 2022-10-31 NOTE — TELEPHONE ENCOUNTER
Received   Hi, this message is for Gracie Leroy  She works for [employer]  This is Deepti Shaw, [phone]  I had another CAT scan done through [facility]  Can you give me a call back when you get a chance? [phone]  Thank you        Spoke to patient  Patient had a CT scan of chest  on 10/28/2022 at [phone]([phone]) ordered by Dr Shyla King    Will  obtain report

## 2022-11-14 ENCOUNTER — TELEPHONE (OUTPATIENT)
Dept: PHYSICAL THERAPY | Facility: OTHER | Age: 57
End: 2022-11-14

## 2022-11-14 NOTE — TELEPHONE ENCOUNTER
Patient called in to [facility] looking for a "neck doctor"  I explained to the patient that csp is a nurse triage department and generally starts with an evaluation with the advanced spine physical therapist  The patient already was denied an appt from Pm, they have "nothing to offer"  I suggested the patient possibly try an Ortho surgeon  But she did not want that either  I advised the patient to possibly follow up with her PCP and ask what her next steps should be  The patient states she was supposed to have another surgery but covid hit, and then her doctor left that practice  Per patient they have no one else taking over those patients or filling infor that previous doctor   This was out of [facility]

## 2022-11-15 ENCOUNTER — OFFICE VISIT (OUTPATIENT)
Dept: HEMATOLOGY ONCOLOGY | Facility: CLINIC | Age: 57
End: 2022-11-15

## 2022-11-15 VITALS
RESPIRATION RATE: 16 BRPM | TEMPERATURE: 97.5 F | HEART RATE: 99 BPM | DIASTOLIC BLOOD PRESSURE: 86 MMHG | BODY MASS INDEX: 49.02 KG/M2 | OXYGEN SATURATION: 96 % | HEIGHT: 67 IN | SYSTOLIC BLOOD PRESSURE: 138 MMHG

## 2022-11-15 DIAGNOSIS — E66.01 OBESITY, MORBID (HCC): ICD-10-CM

## 2022-11-15 DIAGNOSIS — F41.9 ANXIETY AND DEPRESSION: ICD-10-CM

## 2022-11-15 DIAGNOSIS — I82.462 ACUTE DEEP VEIN THROMBOSIS (DVT) OF CALF MUSCLE VEIN OF LEFT LOWER EXTREMITY (HCC): ICD-10-CM

## 2022-11-15 DIAGNOSIS — C64.2 CLEAR CELL RENAL CELL CARCINOMA, LEFT (HCC): Primary | ICD-10-CM

## 2022-11-15 DIAGNOSIS — F32.A ANXIETY AND DEPRESSION: ICD-10-CM

## 2022-11-15 DIAGNOSIS — M25.551 BILATERAL HIP PAIN: ICD-10-CM

## 2022-11-15 DIAGNOSIS — M25.552 BILATERAL HIP PAIN: ICD-10-CM
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
## 2022-12-21 NOTE — ED ADULT NURSE NOTE - NS ED NURSE LEVEL OF CONSCIOUSNESS SPEECH
Speaking Coherently [Post-Menopause, No Sxs] : post-menopausal, currently without symptoms [Menopause Age: ____] : age at menopause was [unfilled] [___] : Living: [unfilled]

## 2023-01-03 ENCOUNTER — TELEPHONE (OUTPATIENT)
Dept: HEMATOLOGY ONCOLOGY | Facility: CLINIC | Age: 58
End: 2023-01-03

## 2023-01-03 NOTE — TELEPHONE ENCOUNTER
Patient is calling in requesting an answer as to why her CT Scan got denied since she was under the impression that she had a prior auth  I reached out to Gareth Askew who informed me that she would be speaking with Dr Doyle tomorrow and reach out to the patient   Patient voiced understanding

## 2023-01-03 NOTE — TELEPHONE ENCOUNTER
Received [facility], patient Corina Hollis 1965 was scheduled for a ct chest abd/pelvis [phone] today 1/3/23 but due to insurance change this was cancelled ( pt has Medicare primary and now has [facility] which is who is denying the auth)       I attached the denial letter which states they need more information to be provided by p2p - before 1/11/22, the p# to call is [phone] and the case ref# is [id number]      Will schedule PEER to PEER for Dr Keri Yuan    Will let patient know the outcome  Patient aware

## 2023-01-04 ENCOUNTER — TELEPHONE (OUTPATIENT)
Dept: HEMATOLOGY ONCOLOGY | Facility: MEDICAL CENTER | Age: 58
End: 2023-01-04

## 2023-01-05 ENCOUNTER — TELEPHONE (OUTPATIENT)
Dept: HEMATOLOGY ONCOLOGY | Facility: CLINIC | Age: 58
End: 2023-01-05

## 2023-01-05 NOTE — TELEPHONE ENCOUNTER
Appointment Cancellation Or Reschedule     Person calling in Patient    If other than patient calling, are they listed on the communication consent form? Provider Dr Keri Yuan   Office Visit Date and Time  1/10/23 2pm    Office Visit Location [facility]   Did patient want to reschedule their office appointment? If so, when was it scheduled to? Yes  1/24/23 2:00 pm    Did you have STAR scheduled for this appointment? n/a   Do you need STAR set up for your new appointment? If yes, please send to "PATIENT RIDESHARE" pool for STAR rescheduling n/a   If you are cancelling appointment, can we notify STAR to cancel ride? If yes, please send to "PATIENT RIDESHARE" pool for STAR to cancel service n/a   Is this patient calling to reschedule an infusion appointment? no   When is their next infusion appointment? n/a   Is this patient a Chemo patient? no   Reason for Cancellation or Reschedule Patient request to r/s appointment until after scheduled Ct 1/11/2023  Patient also requesting call back from Dr Redd Lynn stating that she needs to speak with her  Best call back number [phone]     If the patient is a treatment patient, please route this to the office nurse  If the patient is not on treatment, please route to the office MA  If the patient is a surgical oncology patient, please route to surg/onc clinical pool

## 2023-01-10 ENCOUNTER — APPOINTMENT (OUTPATIENT)
Dept: LAB | Facility: HOSPITAL | Age: 58
End: 2023-01-10

## 2023-01-10 DIAGNOSIS — C64.2 CLEAR CELL RENAL CELL CARCINOMA, LEFT (HCC): ICD-10-CM

## 2023-01-10 LAB
ALBUMIN SERPL BCP-MCNC: 3.4 G/DL (ref 3.5–5)
ALP SERPL-CCNC: 129 U/L (ref 46–116)
ALT SERPL W P-5'-P-CCNC: 27 U/L (ref 12–78)
ANION GAP SERPL CALCULATED.3IONS-SCNC: 8 MMOL/L (ref 4–13)
AST SERPL W P-5'-P-CCNC: 30 U/L (ref 5–45)
BASOPHILS # BLD AUTO: 0.05 THOUSANDS/ÂΜL (ref 0–0.1)
BASOPHILS NFR BLD AUTO: 1 % (ref 0–1)
BILIRUB SERPL-MCNC: 1.36 MG/DL (ref 0.2–1)
BUN SERPL-MCNC: 14 MG/DL (ref 5–25)
CALCIUM ALBUM COR SERPL-MCNC: 9.5 MG/DL (ref 8.3–10.1)
CALCIUM SERPL-MCNC: 9 MG/DL (ref 8.3–10.1)
CHLORIDE SERPL-SCNC: 102 MMOL/L (ref 96–108)
CO2 SERPL-SCNC: 29 MMOL/L (ref 21–32)
CREAT SERPL-MCNC: 0.8 MG/DL (ref 0.6–1.3)
EOSINOPHIL # BLD AUTO: 0.08 THOUSAND/ÂΜL (ref 0–0.61)
EOSINOPHIL NFR BLD AUTO: 1 % (ref 0–6)
ERYTHROCYTE [DISTWIDTH] IN BLOOD BY AUTOMATED COUNT: 13.6 % (ref 11.6–15.1)
GFR SERPL CREATININE-BSD FRML MDRD: 82 ML/MIN/1.73SQ M
GLUCOSE P FAST SERPL-MCNC: 225 MG/DL (ref 65–99)
HCT VFR BLD AUTO: 44.4 % (ref 34.8–46.1)
HGB BLD-MCNC: 14.5 G/DL (ref 11.5–15.4)
IMM GRANULOCYTES # BLD AUTO: 0.01 THOUSAND/UL (ref 0–0.2)
IMM GRANULOCYTES NFR BLD AUTO: 0 % (ref 0–2)
LYMPHOCYTES # BLD AUTO: 1.76 THOUSANDS/ÂΜL (ref 0.6–4.47)
LYMPHOCYTES NFR BLD AUTO: 29 % (ref 14–44)
MCH RBC QN AUTO: 27.6 PG (ref 26.8–34.3)
MCHC RBC AUTO-ENTMCNC: 32.7 G/DL (ref 31.4–37.4)
MCV RBC AUTO: 84 FL (ref 82–98)
MONOCYTES # BLD AUTO: 0.3 THOUSAND/ÂΜL (ref 0.17–1.22)
MONOCYTES NFR BLD AUTO: 5 % (ref 4–12)
NEUTROPHILS # BLD AUTO: 3.82 THOUSANDS/ÂΜL (ref 1.85–7.62)
NEUTS SEG NFR BLD AUTO: 64 % (ref 43–75)
NRBC BLD AUTO-RTO: 0 /100 WBCS
PLATELET # BLD AUTO: 110 THOUSANDS/UL (ref 149–390)
PMV BLD AUTO: 11.4 FL (ref 8.9–12.7)
POTASSIUM SERPL-SCNC: 4.4 MMOL/L (ref 3.5–5.3)
PROT SERPL-MCNC: 7.3 G/DL (ref 6.4–8.4)
RBC # BLD AUTO: 5.26 MILLION/UL (ref 3.81–5.12)
SODIUM SERPL-SCNC: 139 MMOL/L (ref 135–147)
WBC # BLD AUTO: 6.02 THOUSAND/UL (ref 4.31–10.16)

## 2023-01-12 ENCOUNTER — HOSPITAL ENCOUNTER (OUTPATIENT)
Dept: RADIOLOGY | Facility: MEDICAL CENTER | Age: 58
Discharge: HOME | End: 2023-01-12

## 2023-01-12 DIAGNOSIS — C64.2 CLEAR CELL RENAL CELL CARCINOMA, LEFT (HCC): ICD-10-CM

## 2023-01-12 RX ADMIN — IOHEXOL 100 ML: 350 INJECTION, SOLUTION INTRAVENOUS at 11:58

## 2023-01-16 ENCOUNTER — TELEPHONE (OUTPATIENT)
Dept: HEMATOLOGY ONCOLOGY | Facility: MEDICAL CENTER | Age: 58
End: 2023-01-16

## 2023-01-16 NOTE — TELEPHONE ENCOUNTER
Received TEAMs call from patient  Patient asking if CT scan results with be available for 1/17/2023 appt  Patient f/u appt scheduled for 1/24/2023 at [facility]  Patient verbalizes understanding

## 2023-01-18 ENCOUNTER — TELEPHONE (OUTPATIENT)
Dept: HEMATOLOGY ONCOLOGY | Facility: CLINIC | Age: 58
End: 2023-01-18

## 2023-01-19 ENCOUNTER — TELEPHONE (OUTPATIENT)
Dept: HEMATOLOGY ONCOLOGY | Facility: MEDICAL CENTER | Age: 58
End: 2023-01-19

## 2023-01-19 NOTE — TELEPHONE ENCOUNTER
Received TEAMs message for patient asking about CT scan results:  IMPRESSION:     Decreasing posttreatment related changes involving the upper pole of the left kidney      Stable bilateral subcentimeter pulmonary nodules, these are likely benign but attention on follow-up is recommended      No evidence of metastatic disease within the abdomen or pelvis        Patient has f/u 1/24/2023 to discuss  Patient aware

## 2023-01-24 ENCOUNTER — OFFICE VISIT (OUTPATIENT)
Dept: HEMATOLOGY ONCOLOGY | Facility: CLINIC | Age: 58
End: 2023-01-24

## 2023-01-24 VITALS
HEART RATE: 77 BPM | HEIGHT: 67 IN | RESPIRATION RATE: 18 BRPM | DIASTOLIC BLOOD PRESSURE: 85 MMHG | BODY MASS INDEX: 49.02 KG/M2 | OXYGEN SATURATION: 99 % | SYSTOLIC BLOOD PRESSURE: 135 MMHG

## 2023-01-24 DIAGNOSIS — E66.01 OBESITY, MORBID (HCC): Primary | ICD-10-CM

## 2023-01-24 DIAGNOSIS — I82.462 ACUTE DEEP VEIN THROMBOSIS (DVT) OF CALF MUSCLE VEIN OF LEFT LOWER EXTREMITY (HCC): ICD-10-CM

## 2023-01-24 DIAGNOSIS — C64.2 CLEAR CELL RENAL CELL CARCINOMA, LEFT (HCC): ICD-10-CM

## 2023-01-24 NOTE — PROGRESS NOTES
Yanira Johansen  1965  [address] [facility]  [address] [place] [id number]    DISCUSSION/SUMMARY:    49-year-old female with history of left-sided renal cell carcinoma (believed to be clear cell) status post cryoablation at another institution a number of years ago  Patient also with a history of pulmonary nodules  Presently Mrs Hiren Diamond feels okay from an oncology standpoint  Recent blood work was good/WNL (the total bilirubin has always been slightly elevated but stable)  Repeat CAT scan through [facility] did not demonstrate any clear evidence of progressive/metastatic disease  Patient has pulmonary nodules but unchanged from before  Patient denies any respiratory issues  The plan is to continue with surveillance  Patient is to return to the office in 6 months with repeat blood work and CAT scans before  Patient will follow up with pain control and with her endocrinologist (diabetes)  Patient knows to call the office if she has any other oncology questions or concerns  Carefully review your medication list and verify that the list is accurate and up-to-date  Please call the hematology/oncology office if there are medications missing from the list, medications on the list that you are not currently taking or if there is a dosage or instruction that is different from how you're taking that medication  Patient goals and areas of care:  Continue with surveillance  Barriers to care:  Comorbidities  Patient is able to self-care with help of family members  ______________________________________________________________________________________    Chief Complaint   Patient presents with   • Follow-up     History of Present Illness:  62 year female with a complicated hematology and oncology history referred for re-evaluation      Previously patient was found to have a renal mass - worked up at [facility]   Although the specifics are not entirely clear, Mrs Zeeshan Nowak underwent biopsy + cryoablation at the same time  Patient was placed on surveillance  Once again the specifics are not clear but patient previously stated that she had recurrent/metastatic disease  Patient has had abnormal CT scans but no clear diagnosis for recurrence  Patient returns for follow-up  Mrs Laughlin states feeling okay about the same as before  Activities are limited, generalized body aches are the same as before  Patient is seen by a pain specialist   Patient gets around in a wheelchair  Glucose level is more or less controlled, patient follows with endocrine  Appetite is good, weight is stable  No respiratory issues  Review of Systems   Constitutional: Positive for fatigue  HENT: Negative  Eyes: Negative  Respiratory: Negative  Cardiovascular: Negative  Gastrointestinal: Negative  Endocrine: Negative  Genitourinary: Negative  Musculoskeletal: Positive for arthralgias  Skin: Negative  Allergic/Immunologic: Negative  Neurological: Negative  Hematological: Negative  Psychiatric/Behavioral: Negative  All other systems reviewed and are negative      Patient Active Problem List   Diagnosis   • Anxiety and depression   • Asthma   • Atherosclerosis of coronary artery   • Degeneration of intervertebral disc of lumbar region   • Type II diabetes mellitus with neurological manifestations ([address] )   • Dizziness   • Obesity, morbid ([address] )   • Pulmonary nodules   • Bilateral hip pain   • Clear cell renal cell carcinoma, left (HCC)   • Status post cryoablation   • BMI 45 0-49 9, adult ([address] )   • Acute deep vein thrombosis (DVT) of calf muscle vein of left lower extremity (HCC)     Past Medical History:   Diagnosis Date   • Anxiety    • Arthritis    • Asthma    • Clear cell renal cell carcinoma, left (HCC) 5/3/2022   • Depression    • Diabetes mellitus ([address] )    • Disease of thyroid gland    • Hip dislocation, right ([address] ) no hip on the left   • History of transfusion    • Hyperlipidemia     borderline   • Hypertension    • Migraine    • Psychiatric disorder     anxiety, depression     Past Surgical History:   Procedure Laterality Date   • CERVICAL FUSION      X2   • FRACTURE SURGERY      plates left leg , rods right leg   • HIP ARTHROPLASTY     • JOINT REPLACEMENT      9 THR , 3 rev, no left hip , RTKR   • KNEE ARTHROPLASTY     • NECK SURGERY     • SHOULDER ARTHROSCOPY     • TOTAL SHOULDER REPLACEMENT Right      Family History   Problem Relation Age of Onset   • Colon cancer Mother    • Stroke Mother    • Stroke Father      Social History     Socioeconomic History   • Marital status: Single     Spouse name: Not on file   • Number of children: Not on file   • Years of education: Not on file   • Highest education level: Not on file   Occupational History   • Not on file   Tobacco Use   • Smoking status: Former   • Smokeless tobacco: Never   Vaping Use   • Vaping Use: Never used   Substance and Sexual Activity   • Alcohol use: No   • Drug use: No   • Sexual activity: Not on file   Other Topics Concern   • Not on file   Social History Narrative   • Not on file     Social Determinants of Health     Financial Resource Strain: Not on file   Food Insecurity: Not on file   Transportation Needs: Not on file   Physical Activity: Not on file   Stress: Not on file   Social Connections: Not on file   Intimate Partner Violence: Not on file   Housing Stability: Not on file       Current Outpatient Medications:   •  ACCU-CHEK SAMEER PLUS test strip, , Disp: , Rfl: 0  •  albuterol (PROVENTIL HFA,VENTOLIN HFA) 90 mcg/act inhaler, Two puffs every four hours as needed for wheezing, Disp: , Rfl:   •  ALPRAZolam (XANAX) 2 MG tablet, Take 0 5 mg by mouth 4 (four) times a day as needed , Disp: , Rfl:   •  aspirin 81 mg chewable tablet, Chew 81 mg daily, Disp: , Rfl:   •  Blood Glucose Monitoring Suppl (ONE TOUCH ULTRA 2) w/Device KIT, by Does not apply route, Disp: , Rfl:   •  diazepam (VALIUM) 5 mg tablet, Take 5 mg by mouth every 8 (eight) hours as needed for muscle spasms, Disp: , Rfl:   •  glucose blood test strip, test up to 4 times daily diag 250 00, Disp: , Rfl:   •  insulin aspart (NovoLOG) 100 Units/mL SOPN, Inject under the skin , Disp: , Rfl:   •  insulin detemir (LEVEMIR) 100 units/mL subcutaneous injection, Inject 45 Units under the skin 2 (two) times a day , Disp: , Rfl:   •  lisinopril (ZESTRIL) 20 mg tablet, Take 1 tablet by mouth daily, Disp: , Rfl:   •  Misc   [facility]) MISC, by Does not apply route, Disp: , Rfl:   •  [place] LANCETS FINE MISC, by Does not apply route, Disp: , Rfl:   •  oxyCODONE (ROXICODONE) 30 MG immediate release tablet, Take 30 mg by mouth every 4 (four) hours as needed , Disp: , Rfl:   •  rivaroxaban (XARELTO) 2 5 mg tablet, Take 2 5 mg by mouth daily with breakfast Once daily, Disp: , Rfl:   •  atorvastatin (LIPITOR) 20 mg tablet, Take 20 mg by mouth daily (Patient not taking: Reported on 11/2/2021), Disp: , Rfl:   •  clopidogrel (PLAVIX) 75 mg tablet, Take 75 mg by mouth daily (Patient not taking: Reported on 5/3/2022), Disp: , Rfl:   •  cyclobenzaprine (FLEXERIL) 10 mg tablet, Take 1 tablet (10 mg total) by mouth 2 (two) times a day as needed for muscle spasms (Patient not taking: Reported on 5/3/2022), Disp: 20 tablet, Rfl: 0  •  meclizine (ANTIVERT) 25 mg tablet, Take 1 tablet (25 mg total) by mouth every 8 (eight) hours as needed for dizziness (Patient not taking: Reported on 5/3/2022), Disp: 20 tablet, Rfl: 0  •  metoprolol succinate (TOPROL-XL) 25 mg 24 hr tablet, Take 25 mg by mouth daily (Patient not taking: Reported on 5/3/2022), Disp: , Rfl:   •  ondansetron (ZOFRAN-ODT) 4 mg disintegrating tablet, Take 1 tablet (4 mg total) by mouth every 8 (eight) hours as needed for nausea or vomiting for up to 30 days, Disp: 12 tablet, Rfl: 0  •  rosuvastatin (CRESTOR) 40 MG tablet, Take 40 mg by mouth daily (Patient not taking: Reported on 10/18/2022), Disp: , Rfl:     Allergies   Allergen Reactions   • Celecoxib Other (See Comments)     HEADACHES, DIARRHEA   • Pollen Extract Other (See Comments)     pine trees:  hives   • Starch      sneezing   • Tetracycline Other (See Comments)     MIGRAINES       Vitals:    01/24/23 1357   BP: 135/85   Pulse: 77   Resp: 18   SpO2: 99%     Physical Exam  Constitutional:       Appearance: She is well-developed  Comments: Middle-aged obese female, no respiratory distress, no signs of pain, presents in a wheelchair   HENT:      Head: Normocephalic and atraumatic  Right Ear: External ear normal       Left Ear: External ear normal    Eyes:      Conjunctiva/sclera: Conjunctivae normal       Pupils: Pupils are equal, round, and reactive to light  Cardiovascular:      Rate and Rhythm: Normal rate and regular rhythm  Heart sounds: Normal heart sounds  Pulmonary:      Effort: Pulmonary effort is normal       Breath sounds: Normal breath sounds  Comments: Distant breath sounds bilaterally, clear  Abdominal:      General: Bowel sounds are normal       Palpations: Abdomen is soft  Comments: +morbidly obese, +bowel sounds, nontender, can not palpate liver or spleen, no guarding   Musculoskeletal:         General: Normal range of motion  Cervical back: Normal range of motion and neck supple  Skin:     General: Skin is warm  Comments: Relatively good color, warm, moist, no petechiae or ecchymoses   Neurological:      Mental Status: She is alert and oriented to person, place, and time  Deep Tendon Reflexes: Reflexes are normal and symmetric  Psychiatric:         Behavior: Behavior normal          Thought Content:  Thought content normal          Judgment: Judgment normal      Extremities:  0-1 +bilateral lower extremity edema, no cords, pulses are 1+  Lymphatics:  Limited exam, no supraclavicular, cervical or axillary adenopathy bilaterally    Labs    1/10/2023 WBC = 6 02 hemoglobin = 14 5 hematocrit = 44 4 platelet = 942 and neutrophil = 64% BUN = 14 creatinine = 0 80 glucose = 225 calcium = 9 0 AST = 30 ALT = 27 alkaline phosphatase = 129 total protein = 7 3 total bilirubin = 1 36    (Outside institution)  08/24/2022 WBC = 4 2 hemoglobin = 14 6 hematocrit = 43 platelet = 038 neutrophil = 66% lymphocytes = 26% monocyte = 6% eosinophil = 1% BUN = 14 creatinine = 0 88 glucose = 278 calcium = 9 9 alkaline phosphatase = 136 total bilirubin = 1 5 AST = 26 ALT = 34    Imaging    1/12/2023 CAT scan chest abdomen pelvis    COMPARISON:  6/20/2022 2/28/2022    IMPRESSION:     Decreasing posttreatment related changes involving the upper pole of the left kidney  Stable bilateral subcentimeter pulmonary nodules, these are likely benign but attention on follow-up is recommended  No evidence of metastatic disease within the abdomen or pelvis  10/28/2022 12:50 pm CT chest without contrast ([place])    There are no pulmonary consolidations, pleural effusions or pneumothorax  There is a stable punctate nodule in the left lung apex (series 4, image 24)  There is a stable small ground-glass nodule in the medial left upper lobe measuring 0 9 cm (series 4, image 58)  There is a stable punctate nodule in the anterior right lung apex (series 4, image 39)  There is a stable punctate nodule in the right upper lobe (series 4, image 57)  There is a stable small ground-glass nodule in the right middle lobe measuring 0 4 cm (series 4, image 102)  There is a stable small sub solid nodule in the right middle lobe measuring 0 9 cm (series 4 image 113)  There is a stable small ground-glass nodule in the right middle lobe measuring 0 5 cm (series 4, image 125)  There is a stable subpleural ground-glass nodule in the posterior right lower lobe measuring 0 6 cm (series 4, image 153)     There is a stable small solid nodule in the right lower lobe measuring 0 5 cm (series 4, image 109)  There are stable subpleural ground-glass opacities in the left lung base  IMPRESSION     1   Multiple bilateral nonspecific pulmonary nodules are again noted as detailed above, stable in size since chest CT dated 06/20/2022   Continued surveillance is suggested  2   A mildly enlarged right pericardial lymph node measuring 1 2 x 1 3 cm, previously 1 1 x 1 1 cm, mildly increased in size, indeterminate for metastases   Continued surveillance is suggested   Additional smaller pericardial lymph node has not significantly changed in size  08/25/2022 CT scan chest abdomen pelvis [facility])    Impression:     1  Status post cryoablation of left renal mass with no evidence of local   recurrence  2  Several pulmonary nodules which are stable and interval resolution of left   lower lobe subpleural clustered nodularities   3  Diffuse fatty change of liver  Status postcholecystectomy   4  Diffuse osteopenia, degenerative changes and bilateral total hip replacement   with lack of femoral head component of left hip prothesis with adjacent   encapsulated fluid       06/20/2022 CT scan abdomen with and without IV contrast, without oral contrast ([facility])     IMPRESSION   Limited study due to artifact from the patient's body habitus  1  Exophytic hypodense lesion in the upper pole of the left kidney measuring 2 9 cm with a 0 8 cm internal enhancing component, concerning for viable tumor  2  Multiple indeterminate solid nodules throughout both lungs, the largest of which measures 7 mm in the right lower lobe   These nodules are too small to characterize with PET-CT and comparison to prior imaging or follow-up with chest CT is recommended, per clinical protocol for the primary malignancy     3  Multiple indeterminate ground-glass nodules throughout both lungs measuring up to 14 x 9 mm in the left lower lobe   These are unlikely to represent metastases, but could represent infectious/inflammatory nodules or adenocarcinoma spectrum neoplasm   A follow-up chest CT is recommended in 3-6 months  4  Ill-defined ground-glass opacities in the right middle and left lower lobes, which may also represent infection/inflammation versus scarring  5  Mildly enlarged 1 cm right cardiophrenic lymph node, which can be further characterized with PET-CT or followed up with chest CT  6  Indeterminate 0 5 cm sclerotic lesion in the right T5 transverse process

## 2023-03-06 ENCOUNTER — TELEPHONE (OUTPATIENT)
Dept: OBGYN CLINIC | Facility: HOSPITAL | Age: 58
End: 2023-03-06

## 2023-03-06 NOTE — TELEPHONE ENCOUNTER
Caller: Patient     Doctor: n/a     Reason for call: Patient called back to provide the date of surgery it was 04/16/2014       Call back#: [phone]

## 2023-03-06 NOTE — TELEPHONE ENCOUNTER
Sure I can see her  Please ask her to bring a copy of an operative report with her if she can her hands on one  Thank you

## 2023-03-06 NOTE — TELEPHONE ENCOUNTER
Caller: Patient    Doctor: n/a    Reason for call: Patient is calling in looking for a new doctor to see her for her shoulder  She has had a complete shoulder replacement in 2015 with [facility]-she is going to find out the exact date  She has been having a lot of pain the past few days  She is looking for another doctor because her previous surgeon retired and the office does not take her insurance now  Please advise if you would be willing to see this patient       Call back#: [phone]

## 2023-04-03 ENCOUNTER — TELEPHONE (OUTPATIENT)
Dept: OBGYN CLINIC | Facility: HOSPITAL | Age: 58
End: 2023-04-03

## 2023-04-03 NOTE — TELEPHONE ENCOUNTER
Caller: Patient    Doctor: Elif Constantino    Reason for call: Patient was called to reschedule her appt because the doctor is going to be OOO  Her appt was for 9/19 and now she has to wait until 1/2024  Patient is very frustrated because she has been waiting so long  Can Dina Cox possibly see her? Please advise        Call back#: [phone]

## 2023-04-03 NOTE — TELEPHONE ENCOUNTER
Looks like rheumatological work-up was done in December 2022 by Rheum and unrevealing for inflammatory arthritis/autoimmune disease  I will see her if she still wants to see rheumatology, although based on the chart, it does not look like she required further follow-up with our specialty

## 2023-05-30 ENCOUNTER — TELEPHONE (OUTPATIENT)
Dept: OBGYN CLINIC | Facility: HOSPITAL | Age: 58
End: 2023-05-30

## 2023-05-30 NOTE — TELEPHONE ENCOUNTER
Caller: Liudmila    Doctor: karthik    Reason for call: Called patient back and let her know what Dr suggested she was ok      Call back#: [phone]

## 2023-05-30 NOTE — TELEPHONE ENCOUNTER
Caller: Liudmila    Doctor: Rohit Olmstead    Reason for call: patient would like to know if the Dr will see her  She has Bilateral Hip issues & lots of pain  Patient does she pain management for her pain - Dr Clinton De La Cruz  @ [facility]    She states she has had 5 L Hip surgeries including a replacement & revisions  5 R Hip surgeries total & revisions  Starting 02/2009 and recent took out R Hip 2019 @ [facility]    Surgeries at [facility]  [facility] - [id number]  Last images in chart from 11/02/21    She can try and get records from [facility] if needed    [address] [facility] did surgeries    Patient is wheel chair bound  Please advise if Dr will see patient either way please    Call back#: [phone]

## 2023-06-12 ENCOUNTER — APPOINTMENT (OUTPATIENT)
Age: 58
End: 2023-06-12
Payer: COMMERCIAL

## 2023-06-12 DIAGNOSIS — C64.2 CLEAR CELL RENAL CELL CARCINOMA, LEFT (HCC): ICD-10-CM

## 2023-06-12 LAB
BASOPHILS # BLD AUTO: 0.06 THOUSANDS/ÂΜL (ref 0–0.1)
BASOPHILS NFR BLD AUTO: 1 % (ref 0–1)
EOSINOPHIL # BLD AUTO: 0.1 THOUSAND/ÂΜL (ref 0–0.61)
EOSINOPHIL NFR BLD AUTO: 2 % (ref 0–6)
ERYTHROCYTE [DISTWIDTH] IN BLOOD BY AUTOMATED COUNT: 13.5 % (ref 11.6–15.1)
HCT VFR BLD AUTO: 45.2 % (ref 34.8–46.1)
HGB BLD-MCNC: 15.2 G/DL (ref 11.5–15.4)
IMM GRANULOCYTES # BLD AUTO: 0.02 THOUSAND/UL (ref 0–0.2)
IMM GRANULOCYTES NFR BLD AUTO: 0 % (ref 0–2)
LYMPHOCYTES # BLD AUTO: 1.49 THOUSANDS/ÂΜL (ref 0.6–4.47)
LYMPHOCYTES NFR BLD AUTO: 27 % (ref 14–44)
MCH RBC QN AUTO: 28.7 PG (ref 26.8–34.3)
MCHC RBC AUTO-ENTMCNC: 33.6 G/DL (ref 31.4–37.4)
MCV RBC AUTO: 85 FL (ref 82–98)
MONOCYTES # BLD AUTO: 0.35 THOUSAND/ÂΜL (ref 0.17–1.22)
MONOCYTES NFR BLD AUTO: 6 % (ref 4–12)
NEUTROPHILS # BLD AUTO: 3.43 THOUSANDS/ÂΜL (ref 1.85–7.62)
NEUTS SEG NFR BLD AUTO: 64 % (ref 43–75)
NRBC BLD AUTO-RTO: 0 /100 WBCS
PLATELET # BLD AUTO: 124 THOUSANDS/UL (ref 149–390)
PMV BLD AUTO: 12.2 FL (ref 8.9–12.7)
RBC # BLD AUTO: 5.29 MILLION/UL (ref 3.81–5.12)
WBC # BLD AUTO: 5.45 THOUSAND/UL (ref 4.31–10.16)

## 2023-06-12 PROCEDURE — 85025 COMPLETE CBC W/AUTO DIFF WBC: CPT

## 2023-06-12 PROCEDURE — 80053 COMPREHEN METABOLIC PANEL: CPT

## 2023-06-12 PROCEDURE — 36415 COLL VENOUS BLD VENIPUNCTURE: CPT

## 2023-06-13 LAB
ALBUMIN SERPL BCP-MCNC: 3.5 G/DL (ref 3.5–5)
ALP SERPL-CCNC: 125 U/L (ref 46–116)
ALT SERPL W P-5'-P-CCNC: 27 U/L (ref 12–78)
ANION GAP SERPL CALCULATED.3IONS-SCNC: 5 MMOL/L (ref 4–13)
AST SERPL W P-5'-P-CCNC: 21 U/L (ref 5–45)
BILIRUB SERPL-MCNC: 1.79 MG/DL (ref 0.2–1)
BUN SERPL-MCNC: 17 MG/DL (ref 5–25)
CALCIUM SERPL-MCNC: 9.7 MG/DL (ref 8.3–10.1)
CHLORIDE SERPL-SCNC: 108 MMOL/L (ref 96–108)
CO2 SERPL-SCNC: 24 MMOL/L (ref 21–32)
CREAT SERPL-MCNC: 0.9 MG/DL (ref 0.6–1.3)
GFR SERPL CREATININE-BSD FRML MDRD: 71 ML/MIN/1.73SQ M
GLUCOSE P FAST SERPL-MCNC: 263 MG/DL (ref 65–99)
POTASSIUM SERPL-SCNC: 4.6 MMOL/L (ref 3.5–5.3)
PROT SERPL-MCNC: 7.6 G/DL (ref 6.4–8.4)
SODIUM SERPL-SCNC: 137 MMOL/L (ref 135–147)

## 2023-06-26 ENCOUNTER — TELEPHONE (OUTPATIENT)
Dept: HEMATOLOGY ONCOLOGY | Facility: MEDICAL CENTER | Age: 58
End: 2023-06-26

## 2023-06-26 NOTE — TELEPHONE ENCOUNTER
Received call from patient  Patient does not have the barium for CT scan   Patient is going to reschedule CT scan

## 2023-07-06 ENCOUNTER — TELEPHONE (OUTPATIENT)
Dept: HEMATOLOGY ONCOLOGY | Facility: MEDICAL CENTER | Age: 58
End: 2023-07-06

## 2023-07-06 ENCOUNTER — HOSPITAL ENCOUNTER (OUTPATIENT)
Dept: RADIOLOGY | Facility: MEDICAL CENTER | Age: 58
Discharge: HOME | End: 2023-07-06
Attending: INTERNAL MEDICINE
Payer: COMMERCIAL

## 2023-07-06 DIAGNOSIS — C64.2 CLEAR CELL RENAL CELL CARCINOMA, LEFT (HCC): ICD-10-CM

## 2023-07-06 PROCEDURE — 74176 CT ABD & PELVIS W/O CONTRAST: CPT

## 2023-07-06 PROCEDURE — 71250 CT THORAX DX C-: CPT

## 2023-07-06 NOTE — TELEPHONE ENCOUNTER
Received TEAMs message:  Hi, my name is Katheryn. I'm the tech at [facility] in [facility]. I'm calling in reference to Alison Nichols, date of birth [birth year]. She's here for a CAT scan of her chest, abdomen, pelvis ordered by Doctor Franko Barillas. She is an extremely hard stick. Multiple people have tried and we are unable to get an IV, and I just wanted to let you know that she's probably going to be refusing the IV contrast. But she did drink barium, so I guess if we're able to scan her without, please call me back ASAP because she's on my table. If not, she's not, she's probably gonna refuse it. No phone number here is [phone]. Thank you.     Spoke with Katheryn , ok to scan patient

## 2023-07-19 ENCOUNTER — TELEPHONE (OUTPATIENT)
Dept: HEMATOLOGY ONCOLOGY | Facility: CLINIC | Age: 58
End: 2023-07-19

## 2023-07-19 NOTE — TELEPHONE ENCOUNTER
Patient Call    Who are you speaking with? Patient    If it is not the patient, are they listed on an active communication consent form? Yes   What is the reason for this call? Patient [facility]   Does this require a call back? Yes   If a call back is required, please list best call back number [phone]   If a call back is required, advise that a message will be forwarded to their care team and someone will return their call as soon as possible. Did you relay this information to the patient?  Yes

## 2023-07-19 NOTE — TELEPHONE ENCOUNTER
I called [facility] regarding an appointment that they have scheduled with Dr. Romy Gilliam scheduled on 07/25/23     I left a voicemail explaining to patient that this appointment will need to be rescheduled due to a change in the providers schedule. Patient was advised to call [facility] to reschedule. A [employer] message (if applicable) has been sent to patient relaying the above information and advising patient to call [facility] and reschedule their appointment.

## 2023-07-20 ENCOUNTER — TELEPHONE (OUTPATIENT)
Dept: HEMATOLOGY ONCOLOGY | Facility: CLINIC | Age: 58
End: 2023-07-20

## 2023-07-20 NOTE — TELEPHONE ENCOUNTER
Appointment Change  Cancel, Reschedule, Change to Virtual      Who are you speaking with? Patient   If it is not the patient, are they listed on an active communication consent form? Yes   Which provider is the appointment scheduled with? Dr. Chanell Fernando   When is the appointment scheduled? Please list date and time 7/25/23   At which location is the appointment scheduled to take place? [facility]   Was the appointment rescheduled or changed from an in person visit to a virtual visit? If so, please list the details of the change. 9/14/23   What is the reason for the appointment change?  Provider to reschedule

## 2023-08-22 ENCOUNTER — TELEPHONE (OUTPATIENT)
Dept: HEMATOLOGY ONCOLOGY | Facility: CLINIC | Age: 58
End: 2023-08-22

## 2023-08-22 NOTE — TELEPHONE ENCOUNTER
Appointment Confirmation   Who are you speaking with? Patient   If it is not the patient, are they listed on an active communication consent form? N/A   Which provider is the appointment scheduled with? Dr. Delsie Jeans   When is the appointment scheduled? Please list date and time 9/14/23 9am   At which location is the appointment scheduled to take place? [facility]   Did caller verbalize understanding of appointment details?  Yes

## 2023-09-11 ENCOUNTER — TELEPHONE (OUTPATIENT)
Dept: HEMATOLOGY ONCOLOGY | Facility: MEDICAL CENTER | Age: 58
End: 2023-09-11

## 2023-09-11 DIAGNOSIS — I82.462 ACUTE DEEP VEIN THROMBOSIS (DVT) OF CALF MUSCLE VEIN OF LEFT LOWER EXTREMITY (HCC): ICD-10-CM

## 2023-09-11 DIAGNOSIS — E66.01 OBESITY, MORBID (HCC): ICD-10-CM

## 2023-09-11 DIAGNOSIS — C64.2 CLEAR CELL RENAL CELL CARCINOMA, LEFT (HCC): Primary | ICD-10-CM

## 2023-09-13 ENCOUNTER — TELEPHONE (OUTPATIENT)
Dept: HEMATOLOGY ONCOLOGY | Facility: CLINIC | Age: 58
End: 2023-09-13

## 2023-09-13 NOTE — TELEPHONE ENCOUNTER
Appointment Change  Cancel, Reschedule, Change to Virtual      Who are you speaking with? Patient   If it is not the patient, is the caller listed on the communication consent form? N/A   Which provider is the appointment scheduled with? Dr. Geena Solorio   When was the original appointment scheduled? Please list date and time 9/14/23 9am   At which location is the appointment scheduled to take place? [facility]   Was the appointment rescheduled? Was the appointment changed from an in person visit to a virtual visit? If so, please list the details of the change. 11/2/23 @ 220pm   What is the reason for the appointment change? Patient unable to get ride to appt       Was [facility] transport scheduled? No   Does STAR transport need to be scheduled for the new visit (if applicable) No   Does the patient need an infusion appointment rescheduled? No   Does the patient have an upcoming infusion appointment scheduled? If so, when? No   Is the patient undergoing chemotherapy? No   For appointments cancelled with less than 24 hours:  Was the no-show policy reviewed?  N/A

## 2023-09-14 ENCOUNTER — APPOINTMENT (OUTPATIENT)
Age: 58
End: 2023-09-14
Payer: COMMERCIAL

## 2023-09-14 DIAGNOSIS — C64.2 CLEAR CELL RENAL CELL CARCINOMA, LEFT (HCC): ICD-10-CM

## 2023-09-14 DIAGNOSIS — I82.462 ACUTE DEEP VEIN THROMBOSIS (DVT) OF CALF MUSCLE VEIN OF LEFT LOWER EXTREMITY (HCC): ICD-10-CM

## 2023-09-14 DIAGNOSIS — E66.01 OBESITY, MORBID (HCC): ICD-10-CM

## 2023-09-14 LAB
ALBUMIN SERPL BCP-MCNC: 3.7 G/DL (ref 3.5–5)
ALP SERPL-CCNC: 94 U/L (ref 34–104)
ALT SERPL W P-5'-P-CCNC: 16 U/L (ref 7–52)
ANION GAP SERPL CALCULATED.3IONS-SCNC: 6 MMOL/L
AST SERPL W P-5'-P-CCNC: 14 U/L (ref 13–39)
BASOPHILS # BLD AUTO: 0.05 THOUSANDS/ÂΜL (ref 0–0.1)
BASOPHILS NFR BLD AUTO: 1 % (ref 0–1)
BILIRUB SERPL-MCNC: 1.79 MG/DL (ref 0.2–1)
BUN SERPL-MCNC: 15 MG/DL (ref 5–25)
CALCIUM SERPL-MCNC: 9 MG/DL (ref 8.4–10.2)
CHLORIDE SERPL-SCNC: 103 MMOL/L (ref 96–108)
CO2 SERPL-SCNC: 28 MMOL/L (ref 21–32)
CREAT SERPL-MCNC: 0.8 MG/DL (ref 0.6–1.3)
EOSINOPHIL # BLD AUTO: 0.1 THOUSAND/ÂΜL (ref 0–0.61)
EOSINOPHIL NFR BLD AUTO: 2 % (ref 0–6)
ERYTHROCYTE [DISTWIDTH] IN BLOOD BY AUTOMATED COUNT: 13.6 % (ref 11.6–15.1)
GFR SERPL CREATININE-BSD FRML MDRD: 81 ML/MIN/1.73SQ M
GLUCOSE P FAST SERPL-MCNC: 259 MG/DL (ref 65–99)
HCT VFR BLD AUTO: 44 % (ref 34.8–46.1)
HGB BLD-MCNC: 14.7 G/DL (ref 11.5–15.4)
IMM GRANULOCYTES # BLD AUTO: 0.02 THOUSAND/UL (ref 0–0.2)
IMM GRANULOCYTES NFR BLD AUTO: 0 % (ref 0–2)
LYMPHOCYTES # BLD AUTO: 1.49 THOUSANDS/ÂΜL (ref 0.6–4.47)
LYMPHOCYTES NFR BLD AUTO: 25 % (ref 14–44)
MCH RBC QN AUTO: 28.6 PG (ref 26.8–34.3)
MCHC RBC AUTO-ENTMCNC: 33.4 G/DL (ref 31.4–37.4)
MCV RBC AUTO: 86 FL (ref 82–98)
MONOCYTES # BLD AUTO: 0.33 THOUSAND/ÂΜL (ref 0.17–1.22)
MONOCYTES NFR BLD AUTO: 6 % (ref 4–12)
NEUTROPHILS # BLD AUTO: 3.97 THOUSANDS/ÂΜL (ref 1.85–7.62)
NEUTS SEG NFR BLD AUTO: 66 % (ref 43–75)
NRBC BLD AUTO-RTO: 0 /100 WBCS
PLATELET # BLD AUTO: 140 THOUSANDS/UL (ref 149–390)
PMV BLD AUTO: 12.2 FL (ref 8.9–12.7)
POTASSIUM SERPL-SCNC: 4.6 MMOL/L (ref 3.5–5.3)
PROT SERPL-MCNC: 7 G/DL (ref 6.4–8.4)
RBC # BLD AUTO: 5.14 MILLION/UL (ref 3.81–5.12)
SODIUM SERPL-SCNC: 137 MMOL/L (ref 135–147)
WBC # BLD AUTO: 5.96 THOUSAND/UL (ref 4.31–10.16)

## 2023-09-14 PROCEDURE — 85025 COMPLETE CBC W/AUTO DIFF WBC: CPT

## 2023-09-14 PROCEDURE — 36415 COLL VENOUS BLD VENIPUNCTURE: CPT

## 2023-09-14 PROCEDURE — 80053 COMPREHEN METABOLIC PANEL: CPT

## 2023-11-02 ENCOUNTER — TELEPHONE (OUTPATIENT)
Dept: HEMATOLOGY ONCOLOGY | Facility: CLINIC | Age: 58
End: 2023-11-02

## 2023-11-02 ENCOUNTER — OFFICE VISIT (OUTPATIENT)
Dept: HEMATOLOGY ONCOLOGY | Facility: CLINIC | Age: 58
End: 2023-11-02
Payer: COMMERCIAL

## 2023-11-02 VITALS
BODY MASS INDEX: 49.02 KG/M2 | RESPIRATION RATE: 17 BRPM | OXYGEN SATURATION: 97 % | HEIGHT: 67 IN | DIASTOLIC BLOOD PRESSURE: 88 MMHG | TEMPERATURE: 97.6 F | HEART RATE: 99 BPM | SYSTOLIC BLOOD PRESSURE: 128 MMHG

## 2023-11-02 DIAGNOSIS — D69.6 THROMBOCYTOPENIA (HCC): ICD-10-CM

## 2023-11-02 DIAGNOSIS — E11.49 TYPE II DIABETES MELLITUS WITH NEUROLOGICAL MANIFESTATIONS (HCC): ICD-10-CM

## 2023-11-02 DIAGNOSIS — C64.2 CLEAR CELL RENAL CELL CARCINOMA, LEFT (HCC): Primary | ICD-10-CM

## 2023-11-02 PROCEDURE — 99214 OFFICE O/P EST MOD 30 MIN: CPT | Performed by: INTERNAL MEDICINE

## 2023-11-02 NOTE — PROGRESS NOTES
Seema Whyte  1965  [address] [facility]  [address] [id number]    DISCUSSION/SUMMARY:    62year-old female with history of left-sided renal cell carcinoma (believed to be clear cell) status post cryoablation at another institution a number of years ago. Patient also with a history of pulmonary nodules. Prior surveillance has never demonstrated any evidence for recurrence. Presently Mrs. Sarahi Garcias feels well and clinically there are no concerning oncology findings. Recent CBC, CMP were good/WNL (elevated glucose, patient with diabetes). Patient will follow-up with her PCP regarding the elevated glucose level. CAT scans from July 2023 also demonstrated stable pulmonary nodules with no evidence of metastatic/progressive renal cell carcinoma. The plan is to continue with surveillance. Patient is to return in 6 months with repeat blood work and CAT scans before. We rediscussed what to monitor for regard to severe/worsening body aches, decreased activities, pallor, unplanned weight loss, progressive respiratory issues etc.    Carefully review your medication list and verify that the list is accurate and up-to-date. Please call the hematology/oncology office if there are medications missing from the list, medications on the list that you are not currently taking or if there is a dosage or instruction that is different from how you're taking that medication.     Patient goals and areas of care:  Continue with renal cell carcinoma surveillance  Barriers to care:  Comorbidities  Patient is able to self-care with help of family members  ______________________________________________________________________________________    Chief Complaint   Patient presents with    Follow-up    Renal cell carcinoma on surveillance     History of Present Illness:  62 year female with a complicated hematology and oncology history referred for re-evaluation. Previously patient was found to have a renal mass - worked up at [facility].  Although the specifics are not entirely clear, Mrs. Isaías Cabrera underwent biopsy + cryoablation at the same time. Patient was placed on surveillance. Once again the specifics are not clear but patient previously stated that she had recurrent/metastatic disease. Patient has had abnormal CT scans but no clear diagnosis for recurrence. More recently the plan has been surveillance. Patient returns for follow-up. Mrs. Laughlin states feeling okay, about the same as before. Generalized body aches are the same as before, patient uses a motorized scooter. No  issues, no hematuria. No respiratory problems. Activities are limited but the same as before. Patient states her sugar runs approximately 250. Review of Systems   Constitutional:  Positive for fatigue. HENT: Negative. Eyes: Negative. Respiratory: Negative. Cardiovascular: Negative. Gastrointestinal: Negative. Endocrine: Negative. Genitourinary: Negative. Musculoskeletal:  Positive for arthralgias. Skin: Negative. Allergic/Immunologic: Negative. Neurological: Negative. Hematological: Negative. Psychiatric/Behavioral: Negative. All other systems reviewed and are negative.     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)    Dizziness    Obesity, morbid (HCC)    Pulmonary nodules    Bilateral hip pain    Clear cell renal cell carcinoma, left (HCC)    Status post cryoablation    BMI 45.0-49.9, adult (HCC)    Acute deep vein thrombosis (DVT) of calf muscle vein of left lower extremity (HCC)    Thrombocytopenia (HCC)     Past Medical History:   Diagnosis Date    Anxiety     Arthritis     Asthma     Clear cell renal cell carcinoma, left ([address]) 5/3/2022    Depression     Diabetes mellitus ([address])     Disease of thyroid gland     Hip dislocation, right (HCC)     no hip on the left    History of transfusion     Hyperlipidemia     borderline    Hypertension     Migraine     Psychiatric disorder     anxiety, depression     Past Surgical History:   Procedure Laterality Date    CERVICAL FUSION      X2    FRACTURE SURGERY      plates left leg , rods right leg    HIP ARTHROPLASTY      JOINT REPLACEMENT      9 THR , 3 rev, no left hip , RTKR    KNEE ARTHROPLASTY      NECK SURGERY      SHOULDER ARTHROSCOPY      TOTAL SHOULDER REPLACEMENT Right      Family History   Problem Relation Age of Onset    Colon cancer Mother     Stroke Mother     Stroke Father      Social History     Socioeconomic History    Marital status: Single     Spouse name: Not on file    Number of children: Not on file    Years of education: Not on file    Highest education level: Not on file   Occupational History    Not on file   Tobacco Use    Smoking status: Former    Smokeless tobacco: Never   Vaping Use    Vaping Use: Never used   Substance and Sexual Activity    Alcohol use: No    Drug use: No    Sexual activity: Not on file   Other Topics Concern    Not on file   Social History Narrative    Not on file     Social Determinants of Health     Financial Resource Strain: Not on file   Food Insecurity: Not on file   Transportation Needs: Not on file   Physical Activity: Not on file   Stress: Not on file   Social Connections: Not on file   Intimate Partner Violence: Not on file   Housing Stability: Not on file       Current Outpatient Medications:     ACCU-CHEK SAMEER PLUS test strip, , Disp: , Rfl: 0    albuterol (PROVENTIL HFA,VENTOLIN HFA) 90 mcg/act inhaler, Two puffs every four hours as needed for wheezing, Disp: , Rfl:     ALPRAZolam (XANAX) 2 MG tablet, Take 0.5 mg by mouth 4 (four) times a day as needed , Disp: , Rfl:     aspirin 81 mg chewable tablet, Chew 81 mg daily, Disp: , Rfl:     Blood Glucose Monitoring Suppl (ONE TOUCH ULTRA 2) w/Device KIT, by Does not apply route, Disp: , Rfl:     glucose blood test strip, test up to 4 times daily diag 250.00, Disp: , Rfl:     insulin aspart (NovoLOG) 100 Units/mL SOPN, Inject under the skin , Disp: , Rfl:     insulin detemir (LEVEMIR) 100 units/mL subcutaneous injection, Inject 45 Units under the skin 2 (two) times a day , Disp: , Rfl:     lisinopril (ZESTRIL) 20 mg tablet, Take 1 tablet by mouth daily Will take again once prescription renews - PCP visit tomorrow, Disp: , Rfl:     Misc.  Devices [facility]) MISC, by Does not apply route, Disp: , Rfl:     Kremar Bon LANCETS FINE MISC, by Does not apply route, Disp: , Rfl:     oxyCODONE (ROXICODONE) 30 MG immediate release tablet, Take 30 mg by mouth every 4 (four) hours as needed , Disp: , Rfl:     rivaroxaban (XARELTO) 2.5 mg tablet, Take 2.5 mg by mouth daily with breakfast Once daily, Disp: , Rfl:     atorvastatin (LIPITOR) 20 mg tablet, Take 20 mg by mouth daily (Patient not taking: Reported on 11/2/2021), Disp: , Rfl:     clopidogrel (PLAVIX) 75 mg tablet, Take 75 mg by mouth daily (Patient not taking: Reported on 5/3/2022), Disp: , Rfl:     cyclobenzaprine (FLEXERIL) 10 mg tablet, Take 1 tablet (10 mg total) by mouth 2 (two) times a day as needed for muscle spasms (Patient not taking: Reported on 5/3/2022), Disp: 20 tablet, Rfl: 0    diazepam (VALIUM) 5 mg tablet, Take 5 mg by mouth every 8 (eight) hours as needed for muscle spasms (Patient not taking: Reported on 11/2/2023), Disp: , Rfl:     meclizine (ANTIVERT) 25 mg tablet, Take 1 tablet (25 mg total) by mouth every 8 (eight) hours as needed for dizziness (Patient not taking: Reported on 5/3/2022), Disp: 20 tablet, Rfl: 0    metoprolol succinate (TOPROL-XL) 25 mg 24 hr tablet, Take 25 mg by mouth daily (Patient not taking: Reported on 5/3/2022), Disp: , Rfl:     ondansetron (ZOFRAN-ODT) 4 mg disintegrating tablet, Take 1 tablet (4 mg total) by mouth every 8 (eight) hours as needed for nausea or vomiting for up to 30 days, Disp: 12 tablet, Rfl: 0    rosuvastatin (CRESTOR) 40 MG tablet, Take 40 mg by mouth daily (Patient not taking: Reported on 10/18/2022), Disp: , Rfl:     Allergies   Allergen Reactions    Celecoxib Other (See Comments)     HEADACHES, DIARRHEA    Pollen Extract Other (See Comments)     pine trees:  hives    Starch      sneezing    Tetracycline Other (See Comments)     MIGRAINES       Vitals:    11/02/23 1423   BP: 128/88   Pulse: 99   Resp: 17   Temp: 97.6 °F (36.4 °C)   SpO2: 97%     Physical Exam  Constitutional:       Appearance: She is well-developed. Comments: Middle-aged obese female, no respiratory distress, no signs of pain, presents in a scooter   HENT:      Head: Normocephalic and atraumatic. Right Ear: External ear normal.      Left Ear: External ear normal.   Eyes:      Conjunctiva/sclera: Conjunctivae normal.      Pupils: Pupils are equal, round, and reactive to light. Cardiovascular:      Rate and Rhythm: Normal rate and regular rhythm. Heart sounds: Normal heart sounds. Pulmonary:      Effort: Pulmonary effort is normal.      Breath sounds: Normal breath sounds. Comments: Distant breath sounds bilaterally, clear  Abdominal:      General: Bowel sounds are normal.      Palpations: Abdomen is soft. Comments: +morbidly obese, +bowel sounds, nontender, can not palpate liver or spleen, no guarding   Musculoskeletal:         General: Normal range of motion. Cervical back: Normal range of motion and neck supple. Skin:     General: Skin is warm. Comments: Relatively good color, warm, moist, no petechiae or ecchymoses   Neurological:      Mental Status: She is alert and oriented to person, place, and time. Deep Tendon Reflexes: Reflexes are normal and symmetric. Psychiatric:         Behavior: Behavior normal.         Thought Content:  Thought content normal. Judgment: Judgment normal.     Extremities:  1 +bilateral lower extremity edema, no cords, pulses are 1+  Lymphatics:  Limited exam, no supraclavicular, cervical or axillary adenopathy bilaterally    Labs    9/14/2023 WBC = 5.96 hemoglobin = 14.7 hematocrit = 44 platelet = 967 neutrophil = 66% BUN = 15 creatinine = 0.80 glucose = 259 calcium = 9.0 AST = 14 ALT = 16 alkaline phosphatase = 94 total bilirubin = 1.79    1/10/2023 WBC = 6.02 hemoglobin = 14.5 hematocrit = 44.4 platelet = 819 and neutrophil = 64% BUN = 14 creatinine = 0.80 glucose = 225 calcium = 9.0 AST = 30 ALT = 27 alkaline phosphatase = 129 total protein = 7.3 total bilirubin = 1.36  (Outside institution)  08/24/2022 WBC = 4.2 hemoglobin = 14.6 hematocrit = 43 platelet = 649 neutrophil = 66% lymphocytes = 26% monocyte = 6% eosinophil = 1% BUN = 14 creatinine = 0.88 glucose = 278 calcium = 9.9 alkaline phosphatase = 136 total bilirubin = 1.5 AST = 26 ALT = 34    Imaging    7/6/2023 CAT scan chest abdomen pelvis without    LUNGS:  Scattered bilateral pulmonary nodules. *  Stable 4 mm subpleural nodule in the right lower lobe superior segment image 80 series 3.  *  Stable 6 mm right lower lobe nodule posterolaterally image 114 series 3, retrospectively unchanged. *  Stable 6 mm right middle lobe nodule medially image 136 series 3, retrospectively unchanged. *  Stable 5 mm right middle lobe nodule anteriorly image 149 series 3 may be partially groundglass. *  Stable 5 mm subpleural nodule in the right lower lobe posteriorly image 152 series 3.  *  Stable subpleural nodules in the left lower lobe along the costophrenic recess measuring up to 6 mm in size image 186 series 3.  *  Stable small perifissural nodules again noted. *  Retrospectively nodules appear stable since at least CT of 7/8/2021. OSSEOUS STRUCTURES:  No acute fracture or destructive osseous lesion. Prior right shoulder replacement. Bilateral hip replacement.  Suggestion of soft tissue thickening adjacent to the left hip, stable may be related to postsurgical changes. Mild   curvature of the lumbar spine to the left. IMPRESSION:  1. Stable exam.  2.  Stable scattered subcentimeter pulmonary nodules bilaterally. 3. Stable mixed density left renal upper pole mass status post cryoablation. 4.  No new findings suspicious for metastatic disease in the chest, abdomen and pelvis. 1/12/2023 CAT scan chest abdomen pelvis    COMPARISON:  6/20/2022.  2/28/2022    IMPRESSION:     Decreasing posttreatment related changes involving the upper pole of the left kidney. Stable bilateral subcentimeter pulmonary nodules, these are likely benign but attention on follow-up is recommended. No evidence of metastatic disease within the abdomen or pelvis. 10/28/2022 12:50 pm CT chest without contrast ([employer])    There are no pulmonary consolidations, pleural effusions or pneumothorax. There is a stable punctate nodule in the left lung apex (series 4, image 24). There is a stable small ground-glass nodule in the medial left upper lobe measuring 0.9 cm (series 4, image 58). There is a stable punctate nodule in the anterior right lung apex (series 4, image 39). There is a stable punctate nodule in the right upper lobe (series 4, image 57). There is a stable small ground-glass nodule in the right middle lobe measuring 0.4 cm (series 4, image 102). There is a stable small sub solid nodule in the right middle lobe measuring 0.9 cm (series 4 image 113). There is a stable small ground-glass nodule in the right middle lobe measuring 0.5 cm (series 4, image 125). There is a stable subpleural ground-glass nodule in the posterior right lower lobe measuring 0.6 cm (series 4, image 153). There is a stable small solid nodule in the right lower lobe measuring 0.5 cm (series 4, image 109). There are stable subpleural ground-glass opacities in the left lung base. IMPRESSION     1.   Multiple bilateral nonspecific pulmonary nodules are again noted as detailed above, stable in size since chest CT dated 06/20/2022. Continued surveillance is suggested. 2.  A mildly enlarged right pericardial lymph node measuring 1.2 x 1.3 cm, previously 1.1 x 1.1 cm, mildly increased in size, indeterminate for metastases. Continued surveillance is suggested. Additional smaller pericardial lymph node has not significantly changed in size. 08/25/2022 CT scan chest abdomen pelvis [facility])    Impression:     1. Status post cryoablation of left renal mass with no evidence of local   recurrence. 2. Several pulmonary nodules which are stable and interval resolution of left   lower lobe subpleural clustered nodularities   3. Diffuse fatty change of liver. Status postcholecystectomy   4. Diffuse osteopenia, degenerative changes and bilateral total hip replacement   with lack of femoral head component of left hip prothesis with adjacent   encapsulated fluid       06/20/2022 CT scan abdomen with and without IV contrast, without oral contrast ([employer])     IMPRESSION   Limited study due to artifact from the patient's body habitus. 1. Exophytic hypodense lesion in the upper pole of the left kidney measuring 2.9 cm with a 0.8 cm internal enhancing component, concerning for viable tumor. 2. Multiple indeterminate solid nodules throughout both lungs, the largest of which measures 7 mm in the right lower lobe. These nodules are too small to characterize with PET-CT and comparison to prior imaging or follow-up with chest CT is recommended, per clinical protocol for the primary malignancy. 3. Multiple indeterminate ground-glass nodules throughout both lungs measuring up to 14 x 9 mm in the left lower lobe. These are unlikely to represent metastases, but could represent infectious/inflammatory nodules or adenocarcinoma spectrum neoplasm. A follow-up chest CT is recommended in 3-6 months.    4. Ill-defined ground-glass opacities in the right middle and left lower lobes, which may also represent infection/inflammation versus scarring. 5. Mildly enlarged 1 cm right cardiophrenic lymph node, which can be further characterized with PET-CT or followed up with chest CT. 6. Indeterminate 0.5 cm sclerotic lesion in the right T5 transverse process.

## 2023-11-02 NOTE — TELEPHONE ENCOUNTER
Patient Running Late       Who are you speaking with? Patient   If it is not the patient, are they listed on an active communication consent form? N/A   When is the appointment scheduled? Please list date and time 11/2/23 2:20pm   At which location is the appointment scheduled to take place? [facility]   If patient is running less than 15 minutes late, have patient proceed to office. Appointment may need to be rescheduled at providers discretion. If provider cannot see patient today, the office will reschedule the visit. Was this relayed to patient? Yes         Patient stated she would make it by 2:20pm but wanted to let the office know.

## 2023-11-02 NOTE — TELEPHONE ENCOUNTER
Called and spoke with patient regarding scheduled CT appt.  Confirmed appt for 4/1/2024 at 10:30am at [facility]

## 2024-03-01 ENCOUNTER — TELEPHONE (OUTPATIENT)
Dept: HEMATOLOGY ONCOLOGY | Facility: CLINIC | Age: 59
End: 2024-03-01

## 2024-03-01 NOTE — TELEPHONE ENCOUNTER
Patient Call    Who are you speaking with? Insurance Company- [employer]    If it is not the patient, are they listed on an active communication consent form? Yes   What is the reason for this call? Aparna calling to request Dr. Doyle send prior authorization for a CT Scan for patient. Please fax  [phone]   Does this require a call back? Yes- if additional information is needed   If a call back is required, please list best call back number [phone]   If a call back is required, advise that a message will be forwarded to their care team and someone will return their call as soon as possible.   Did you relay this information to the patient? yes

## 2024-03-05 ENCOUNTER — TELEPHONE (OUTPATIENT)
Dept: HEMATOLOGY ONCOLOGY | Facility: CLINIC | Age: 59
End: 2024-03-05

## 2024-03-05 NOTE — TELEPHONE ENCOUNTER
Patient Call    Who are you speaking with? Patient    If it is not the patient, are they listed on an active communication consent form? Yes   What is the reason for this call? Patient needs prior auth by insurance for CT scan [employer]   Does this require a call back? Yes   If a call back is required, please list best call back number [phone]    If a call back is required, advise that a message will be forwarded to their care team and someone will return their call as soon as possible.   Did you relay this information to the patient? Yes

## 2024-03-06 ENCOUNTER — TELEPHONE (OUTPATIENT)
Dept: HEMATOLOGY ONCOLOGY | Facility: CLINIC | Age: 59
End: 2024-03-06

## 2024-03-06 ENCOUNTER — TELEPHONE (OUTPATIENT)
Dept: HEMATOLOGY ONCOLOGY | Facility: MEDICAL CENTER | Age: 59
End: 2024-03-06

## 2024-03-06 NOTE — TELEPHONE ENCOUNTER
Received TEAMs message from patient:  My name is Liudmila Foley. My phone number is [phone], date of birth [birth year]. I have an upcoming cat scan appointment on April 1st and my insurance company needs pre authorization for it. I they called about the paperwork on Friday and haven't received anything yet. So can somebody please send them the paperwork to get the pre authorization rolling for my CAT scan? Again Liudmila Baeza [phone]. My insurance is [employer]. Please call me back. Thank you.      Will send to PEC team to advise on auth for CT scan

## 2024-03-06 NOTE — TELEPHONE ENCOUNTER
Call Transfer   Who are you speaking with?  Patient   If it is not the patient, are they listed on an active communication consent form? N/A   Who is the patients HemOnc/SurgOnc provider? Dr. Doyle   What is the reason for this call? The patient is requesting to speak to the office regarding a prior auth that is needed for her CT scan, the patient is worried because she does not want to R/S this appointment.    Person/Department that the call was transferred to?    Time that call was transferred?    Nelli Mccollum @ 2:21PM    Your call will be transferred now. If you receive a voicemail, please leave a detailed message and a member of the team will return your call as soon as possible.    Did you relay this information to the caller?  Yes

## 2024-03-07 ENCOUNTER — TELEPHONE (OUTPATIENT)
Dept: HEMATOLOGY ONCOLOGY | Facility: CLINIC | Age: 59
End: 2024-03-07

## 2024-03-07 NOTE — TELEPHONE ENCOUNTER
Patient Call    Who are you speaking with? Patient    If it is not the patient, are they listed on an active communication consent form? N/A   What is the reason for this call? Patient is calling in to speak to someone about the prior authorization she needs for her CT scan on 04/1 the patient states this could take weeks and she does not wants to reschedule this CT scan.     The patient would appreciate a call from someone to at least know this is being worked on.    Does this require a call back? Yes   If a call back is required, please list [facility] call back number [phone]   If a call back is required, advise that a message will be forwarded to their care team and someone will return their call as soon as possible.   Did you relay this information to the patient? Yes

## 2024-04-05 ENCOUNTER — TELEPHONE (OUTPATIENT)
Age: 59
End: 2024-04-05

## 2024-04-05 NOTE — TELEPHONE ENCOUNTER
Patient is calling regarding cancelling an appointment.     Date/Time: TODAY, 4/5 @ 8:40AM     Patient was rescheduled: YES [ ] NO [ X]     Patient requesting call back to reschedule: YES [X ] NO [ ]

## 2024-04-08 ENCOUNTER — HOSPITAL ENCOUNTER (OUTPATIENT)
Dept: CT IMAGING | Facility: HOSPITAL | Age: 59
Discharge: HOME | End: 2024-04-08
Payer: MEDICARE

## 2024-04-08 DIAGNOSIS — C64.2 CLEAR CELL RENAL CELL CARCINOMA, LEFT (HCC): ICD-10-CM

## 2024-04-08 PROCEDURE — G1004 CDSM NDSC: HCPCS

## 2024-04-08 PROCEDURE — 71250 CT THORAX DX C-: CPT

## 2024-04-08 PROCEDURE — 74176 CT ABD & PELVIS W/O CONTRAST: CPT

## 2024-04-29 ENCOUNTER — TELEPHONE (OUTPATIENT)
Age: 59
End: 2024-04-29

## 2024-04-29 NOTE — TELEPHONE ENCOUNTER
Caller: Self    Doctor: Regulo    Reason for call: Patient scheduled for NP. She has had numerous hip surgerieswants to know if provider would review her imaging report and notes to see if he would be able to help her before she makes the drive. Please let patient know either way    Call back#: [phone]

## 2024-05-01 ENCOUNTER — TELEPHONE (OUTPATIENT)
Dept: OBGYN CLINIC | Facility: CLINIC | Age: 59
End: 2024-05-01

## 2024-05-01 ENCOUNTER — TELEPHONE (OUTPATIENT)
Age: 59
End: 2024-05-01

## 2024-05-01 ENCOUNTER — APPOINTMENT (OUTPATIENT)
Age: 59
End: 2024-05-01
Payer: COMMERCIAL

## 2024-05-01 DIAGNOSIS — C64.2 CLEAR CELL RENAL CELL CARCINOMA, LEFT (HCC): ICD-10-CM

## 2024-05-01 LAB
BASOPHILS # BLD AUTO: 0.08 THOUSANDS/ÂΜL (ref 0–0.1)
BASOPHILS NFR BLD AUTO: 1 % (ref 0–1)
EOSINOPHIL # BLD AUTO: 0.11 THOUSAND/ÂΜL (ref 0–0.61)
EOSINOPHIL NFR BLD AUTO: 2 % (ref 0–6)
ERYTHROCYTE [DISTWIDTH] IN BLOOD BY AUTOMATED COUNT: 13.5 % (ref 11.6–15.1)
HCT VFR BLD AUTO: 45.4 % (ref 34.8–46.1)
HGB BLD-MCNC: 14.6 G/DL (ref 11.5–15.4)
IMM GRANULOCYTES # BLD AUTO: 0.01 THOUSAND/UL (ref 0–0.2)
IMM GRANULOCYTES NFR BLD AUTO: 0 % (ref 0–2)
LYMPHOCYTES # BLD AUTO: 1.77 THOUSANDS/ÂΜL (ref 0.6–4.47)
LYMPHOCYTES NFR BLD AUTO: 30 % (ref 14–44)
MCH RBC QN AUTO: 27.2 PG (ref 26.8–34.3)
MCHC RBC AUTO-ENTMCNC: 32.2 G/DL (ref 31.4–37.4)
MCV RBC AUTO: 85 FL (ref 82–98)
MONOCYTES # BLD AUTO: 0.33 THOUSAND/ÂΜL (ref 0.17–1.22)
MONOCYTES NFR BLD AUTO: 6 % (ref 4–12)
NEUTROPHILS # BLD AUTO: 3.71 THOUSANDS/ÂΜL (ref 1.85–7.62)
NEUTS SEG NFR BLD AUTO: 61 % (ref 43–75)
NRBC BLD AUTO-RTO: 0 /100 WBCS
PLATELET # BLD AUTO: 126 THOUSANDS/UL (ref 149–390)
PMV BLD AUTO: 11.9 FL (ref 8.9–12.7)
RBC # BLD AUTO: 5.37 MILLION/UL (ref 3.81–5.12)
WBC # BLD AUTO: 6.01 THOUSAND/UL (ref 4.31–10.16)

## 2024-05-01 PROCEDURE — 36415 COLL VENOUS BLD VENIPUNCTURE: CPT

## 2024-05-01 PROCEDURE — 80053 COMPREHEN METABOLIC PANEL: CPT

## 2024-05-01 PROCEDURE — 85025 COMPLETE CBC W/AUTO DIFF WBC: CPT

## 2024-05-01 NOTE — TELEPHONE ENCOUNTER
Received email from Call Center team that patient was requesting to speak to a manager. Returned patient's call to discuss her concerns but patient did not answer and had no voicemail to leave a message. I will try again later.

## 2024-05-01 NOTE — TELEPHONE ENCOUNTER
----- Message from Andre Rajput MD sent at 4/30/2024 10:00 PM EDT -----  Regarding: Weight loss  Before Liudmila makes the long drive, she should know that [facility] has a BMI cutoff for surgery. She would need to lose at lease 50 lbs before I could offer any surgical intervention. If she wants to cancel the appointment, I understand.

## 2024-05-02 ENCOUNTER — TELEPHONE (OUTPATIENT)
Dept: HEMATOLOGY ONCOLOGY | Facility: CLINIC | Age: 59
End: 2024-05-02

## 2024-05-02 ENCOUNTER — TELEPHONE (OUTPATIENT)
Dept: HEMATOLOGY ONCOLOGY | Facility: MEDICAL CENTER | Age: 59
End: 2024-05-02

## 2024-05-02 NOTE — TELEPHONE ENCOUNTER
Patient Running Late       Who are you speaking with? Child   If it is not the patient, are they listed on an active communication consent form? Yes   When is the appointment scheduled?  Please list date and time 5/2/24 11:40am   At which location is the appointment scheduled to take place? [facility]   If patient is running less than 15 minutes late, have patient proceed to office. Appointment may need to be rescheduled at providers discretion. If provider cannot see patient today, the office will reschedule the visit.     Was this relayed to patient? Yes         Patient's daughter stated she missed the exit and would be 10 minutes late for the appointment. I informed patient's daughter that if patient is more than 15 minutes late for the appointment that patient may not be seen today.  Patient's daughter was upset by this and disconnected the call.

## 2024-05-02 NOTE — TELEPHONE ENCOUNTER
Appointment Confirmation   Who are you speaking with? Patient   If it is not the patient, are they listed on an active communication consent form? N/A   Which provider is the appointment scheduled with?  Dr. Doyle   When is the appointment scheduled?  Please list date and time 05/02/2024 @ 11:40AM    At which location is the appointment scheduled to take place? [facility]   Did caller verbalize understanding of appointment details? Yes

## 2024-05-02 NOTE — TELEPHONE ENCOUNTER
"After relaying quick version of what Dr. Doyle discussed regarding results, which summarizes to, \"Blood work was normal (aside from elevated glucose levels - unrelated issue), and CT showed no cancerous growths.\"      I reiterated Dr. Doyle would follow up with tele-visit next week, as he is with other patients and unable to see patient late.  Liudmila relayed annoyance, stated if unable to be seen now, she will discontinue care, and does not want a follow-up.  Number provided for callback [phone] if her mind was changed.  "

## 2024-05-02 NOTE — TELEPHONE ENCOUNTER
Patient Call    Who are you speaking with? Patient    If it is not the patient, are they listed on an active communication consent form? Yes   What is the reason for this call? Patient wants to know if her appt can be virtual    Does this require a call back? Yes   If a call back is required, please list best call back number [phone]   If a call back is required, advise that a message will be forwarded to their care team and someone will return their call as soon as possible.   Did you relay this information to the patient? Yes

## 2024-05-08 LAB
ALBUMIN SERPL BCP-MCNC: 3.7 G/DL (ref 3.5–5)
ALP SERPL-CCNC: 115 U/L (ref 34–104)
ALT SERPL W P-5'-P-CCNC: 22 U/L (ref 7–52)
ANION GAP SERPL CALCULATED.3IONS-SCNC: 10 MMOL/L (ref 4–13)
AST SERPL W P-5'-P-CCNC: 19 U/L (ref 13–39)
BILIRUB SERPL-MCNC: 1.35 MG/DL (ref 0.2–1)
BUN SERPL-MCNC: 15 MG/DL (ref 5–25)
CALCIUM SERPL-MCNC: 8.8 MG/DL (ref 8.4–10.2)
CHLORIDE SERPL-SCNC: 101 MMOL/L (ref 96–108)
CO2 SERPL-SCNC: 26 MMOL/L (ref 21–32)
CREAT SERPL-MCNC: 0.74 MG/DL (ref 0.6–1.3)
GFR SERPL CREATININE-BSD FRML MDRD: 89 ML/MIN/1.73SQ M
GLUCOSE P FAST SERPL-MCNC: 235 MG/DL (ref 65–99)
POTASSIUM SERPL-SCNC: 3.9 MMOL/L (ref 3.5–5.3)
PROT SERPL-MCNC: 7.1 G/DL (ref 6.4–8.4)
SODIUM SERPL-SCNC: 137 MMOL/L (ref 135–147)

## 2024-06-17 ENCOUNTER — TELEPHONE (OUTPATIENT)
Age: 59
End: 2024-06-17

## 2024-06-17 ENCOUNTER — OFFICE VISIT (OUTPATIENT)
Age: 59
End: 2024-06-17
Payer: COMMERCIAL

## 2024-06-17 VITALS
DIASTOLIC BLOOD PRESSURE: 88 MMHG | TEMPERATURE: 98.5 F | SYSTOLIC BLOOD PRESSURE: 162 MMHG | OXYGEN SATURATION: 94 % | HEART RATE: 98 BPM | RESPIRATION RATE: 18 BRPM

## 2024-06-17 DIAGNOSIS — E66.01 OBESITY, MORBID (HCC): ICD-10-CM

## 2024-06-17 DIAGNOSIS — F41.9 ANXIETY AND DEPRESSION: ICD-10-CM

## 2024-06-17 DIAGNOSIS — F32.A ANXIETY AND DEPRESSION: ICD-10-CM

## 2024-06-17 DIAGNOSIS — I82.462 ACUTE DEEP VEIN THROMBOSIS (DVT) OF CALF MUSCLE VEIN OF LEFT LOWER EXTREMITY (HCC): ICD-10-CM

## 2024-06-17 DIAGNOSIS — R03.0 ELEVATED BLOOD PRESSURE READING: ICD-10-CM

## 2024-06-17 DIAGNOSIS — I10 PRIMARY HYPERTENSION: ICD-10-CM

## 2024-06-17 DIAGNOSIS — Z12.31 ENCOUNTER FOR SCREENING MAMMOGRAM FOR BREAST CANCER: ICD-10-CM

## 2024-06-17 DIAGNOSIS — Z00.00 MEDICARE ANNUAL WELLNESS VISIT, SUBSEQUENT: ICD-10-CM

## 2024-06-17 DIAGNOSIS — C64.2 CLEAR CELL RENAL CELL CARCINOMA, LEFT (HCC): ICD-10-CM

## 2024-06-17 DIAGNOSIS — E11.49 TYPE II DIABETES MELLITUS WITH NEUROLOGICAL MANIFESTATIONS (HCC): Primary | ICD-10-CM

## 2024-06-17 DIAGNOSIS — Z11.59 NEED FOR HEPATITIS C SCREENING TEST: ICD-10-CM

## 2024-06-17 DIAGNOSIS — J45.20 MILD INTERMITTENT ASTHMA WITHOUT COMPLICATION: ICD-10-CM

## 2024-06-17 DIAGNOSIS — Z11.4 SCREENING FOR HIV (HUMAN IMMUNODEFICIENCY VIRUS): ICD-10-CM

## 2024-06-17 PROBLEM — F32.2 CURRENT SEVERE EPISODE OF MAJOR DEPRESSIVE DISORDER WITHOUT PSYCHOTIC FEATURES WITHOUT PRIOR EPISODE (HCC): Status: ACTIVE | Noted: 2024-06-17

## 2024-06-17 LAB — SL AMB POCT HEMOGLOBIN AIC: 10.5 (ref ?–6.5)

## 2024-06-17 PROCEDURE — 83036 HEMOGLOBIN GLYCOSYLATED A1C: CPT

## 2024-06-17 PROCEDURE — G0439 PPPS, SUBSEQ VISIT: HCPCS

## 2024-06-17 PROCEDURE — 99214 OFFICE O/P EST MOD 30 MIN: CPT

## 2024-06-17 RX ORDER — LISINOPRIL 20 MG/1
20 TABLET ORAL DAILY
Qty: 30 TABLET | Refills: 5 | Status: SHIPPED | OUTPATIENT
Start: 2024-06-17

## 2024-06-17 RX ORDER — ALBUTEROL SULFATE 90 UG/1
2 AEROSOL, METERED RESPIRATORY (INHALATION) EVERY 6 HOURS PRN
Qty: 18 G | Refills: 3 | Status: SHIPPED | OUTPATIENT
Start: 2024-06-17

## 2024-06-17 NOTE — TELEPHONE ENCOUNTER
Patient did make a one month followup in the office although she states the office was going to schedule her endo appt for her if you could please advise her of that date and time

## 2024-06-17 NOTE — ASSESSMENT & PLAN NOTE
Restart lisinopril 20 mg daily.  Recommend BP check with nurse visit in 1 week and reevaluation in the office in a month.

## 2024-06-17 NOTE — ASSESSMENT & PLAN NOTE
Restart lisinopril 20 mg daily.  Will have the patient follow-up in 1 week for BP check and follow-up in the office in 1 month.

## 2024-06-17 NOTE — PATIENT INSTRUCTIONS
Type 2 Diabetes Management for Adults   AMBULATORY CARE:   Type 2 diabetes  is a disease that affects how your body uses glucose (sugar). Either your body cannot make enough insulin, or it cannot use the insulin correctly. It is important to keep diabetes controlled to prevent damage to your heart, blood vessels, and other organs. Management will help you feel well and enjoy your daily activities. Your diabetes care team providers can help you make a plan to fit diabetes care into your schedule. Your plan can change over time to fit your needs and your family's needs.       Have someone call your local emergency number (911 in the [place]) if:   You cannot be woken.    You have signs of diabetic ketoacidosis:     confusion, fatigue    vomiting    rapid heartbeat    fruity smelling breath    extreme thirst    dry mouth and skin    You have any of the following signs of a heart attack:      Squeezing, pressure, or pain in your chest    You may  also have any of the following:     Discomfort or pain in your back, neck, jaw, stomach, or arm    Shortness of breath    Nausea or vomiting    Lightheadedness or a sudden cold sweat    You have any of the following signs of a stroke:      Numbness or drooping on one side of your face     Weakness in an arm or leg    Confusion or difficulty speaking    Dizziness, a severe headache, or vision loss    Call your doctor or diabetes care team provider if:   You have a sore or wound that will not heal.    You have a change in the amount you urinate.    Your blood sugar levels are higher than your target goals.    You often have lower blood sugar levels than your target goals.    Your skin is red, dry, warm, or swollen.    You have trouble coping with diabetes, or you feel anxious or depressed.    You have trouble following any part of your care plan, such as your meal plan.    You have questions or concerns about your condition or care.    What you need to know about high blood sugar  levels:  High blood sugar levels may not cause any symptoms. You may feel more thirsty or urinate more often than usual. Over time, high blood sugar levels can damage your nerves, blood vessels, tissues, and organs. The following can increase your blood sugar levels:  Large meals or large amounts of carbohydrates at one time    Less physical activity    Stress    Illness    A lower dose of diabetes medicine or insulin, or a late dose    What you need to know about low blood sugar levels:  Symptoms include feeling shaky, dizzy, irritable, or confused. You can prevent symptoms by keeping your blood sugar levels from going too low.  Treat a low blood sugar level right away:      Drink 4 ounces of juice or have 1 tube of glucose gel.    Check your blood sugar level again 10 to 15 minutes later.    When the level goes back to normal, eat a meal or snack to prevent another decrease.       Keep glucose gel, raisins, or hard candy with you at all times to treat a low blood sugar level.     Your blood sugar level can get too low if you take diabetes medicine or insulin and do not eat enough food.     If you use insulin, check your blood sugar level before you exercise.      If your blood sugar level is below 100 mg/dL, eat 4 crackers or 2 ounces of raisins, or drink 4 ounces of juice.    Check your level every 30 minutes if you exercise longer than 1 hour.    You may need a snack during or after exercise.    What you can do to manage your blood sugar levels:   Check your blood sugar levels as directed and as needed.  Several items are available to use to check your levels. You may need to check by testing a drop of blood in a glucose monitor. You may instead be given a continuous glucose monitoring (CGM) device. The device is worn at all times. The CGM checks your blood sugar level every 5 minutes. It sends results to an electronic device such as a smart phone. A CGM can be used with or without an insulin pump. You and your  diabetes care team providers will decide on the best method for you. The goal for blood sugar levels before meals  is between 80 and 130 mg/dL and 2 hours after eating  is lower than 180 mg/dL.            Make healthy food choices.  Work with a dietitian to create a meal plan that works for you and your schedule. A dietitian can help you learn how to eat the right amount of carbohydrates (sugar and starchy foods) during your meals and snacks. Examples of carbohydrates are breads, cereals, rice, pasta, fruit, low-fat dairy, and sweets. Carbohydrates can raise your blood sugar level if you eat too many at one time.         Eat high-fiber foods as directed.  Fiber helps improve blood sugar levels. Fiber also lowers your risk for heart disease and other problems diabetes can cause. Examples of high-fiber foods include vegetables, whole-grain bread, and beans such as chávez beans. Your dietitian can tell you how much fiber to have each day.         Get regular physical activity.  Physical activity can help you get to your target blood sugar level goal and manage your weight. Get at least 150 minutes of moderate to vigorous aerobic physical activity each week. Resistance training, such as lifting weights, should be done 3 times each week. Do not miss more than 2 days of physical activity in a row. Do not sit longer than 30 minutes at a time. Your healthcare provider can help you create an activity plan. The plan can include the best activities for you and can help you build your strength and endurance.            Maintain a healthy weight.  Ask your team what a healthy weight is for you. A healthy weight can help you control diabetes and prevent heart disease. Ask your team to help you create a weight-loss plan, if needed. Even a loss of 3% to 7% of your excess body weight can help make a difference in managing diabetes. Your team will help you set a weight-loss goal, such as 10 to 15 pounds, or 5% of your extra weight.  Together you and your team can set manageable weight-loss goals.    Take your diabetes medicine or insulin as directed.  You may need diabetes medicine, insulin, or both to help control your blood sugar levels. Your healthcare provider will teach you how and when to take your diabetes medicine or insulin. You will also be taught about side effects oral diabetes medicine can cause. Insulin may be injected or given through a pump or pen. You and your providers will decide on the best method for you:    An insulin pump  is an implanted device that gives your insulin 24 hours a day. An insulin pump prevents the need for multiple insulin injections in a day.         An insulin pen  is a device prefilled with the right amount of insulin.         You and your family members will be taught how to draw up and give insulin  if this is the best method for you. Your providers will also teach you how to dispose of needles and syringes.    You will learn how much insulin you need  and when to give it. You will be taught when not to give insulin. You will also be taught what to do if your blood sugar level drops too low. This may happen if you take insulin and do not eat the right amount of carbohydrates.    More ways to manage type 2 diabetes:   Wear medical alert identification.  Wear medical alert jewelry or carry a card that says you have diabetes. Ask your provider where to get these items.         Do not smoke.  Nicotine and other chemicals in cigarettes and cigars can cause lung and blood vessel damage. It also makes it more difficult to manage your diabetes. Ask your provider for information if you currently smoke and need help to quit. Do not use e-cigarettes or smokeless tobacco in place of cigarettes or to help you quit. They still contain nicotine.    Check your feet each day for cuts, scratches, calluses, or other wounds.  Look for redness and swelling, and feel for warmth. Wear shoes that fit well. Check your shoes  for rocks or other objects that can hurt your feet. Do not walk barefoot or wear shoes without socks. Wear cotton socks to help keep your feet dry.         Ask about vaccines you may need.  You have a higher risk for serious illness if you get the flu, pneumonia, COVID-19, or hepatitis. Ask your provider if you should get vaccines to prevent these or other diseases, and when to get the vaccines.    Talk to your provider if you become stressed about diabetes care.  Sometimes being able to fit diabetes care into your life can cause increased stress. The stress can cause you not to take care of yourself properly. Your provider can help by offering tips about self-care. A mental health provider can listen and offer help with self-care issues. Other types of counseling can help you make nutrition or physical activity changes.    Have your A1c checked as directed.  Your provider may check your A1c every 3 months, or 2 times each year if your diabetes is controlled. An A1c test shows the average amount of sugar in your blood over the past 2 to 3 months. Your provider will tell you what your A1c level should be.    Have screening tests as directed.  Your provider may recommend screening for complications of diabetes and other conditions that may develop. Some screenings may begin right away and some may happen within the first 5 years of diagnosis:    Examples of diabetes complications  include kidney problems, high cholesterol, high blood pressure, blood vessel problems, eye problems, and sleep apnea.    You may be screened for a low vitamin B level  if you take oral diabetes medicine for a long time.    You may be screened for polycystic ovarian syndrome (PCOS)  if you are of childbearing age.    Follow up with your doctor or diabetes care team providers as directed:  You may need to have blood tests done before your follow-up visit. The test results will show if changes need to be made in your treatment or self-care.  Talk to your provider if you cannot afford your medicine. Write down your questions so you remember to ask them during your visits.  © Copyright [employer] 2023 Information is for End User's use only and may not be sold, redistributed or otherwise used for commercial purposes.  The above information is an  only. It is not intended as medical advice for individual conditions or treatments. Talk to your doctor, nurse or pharmacist before following any medical regimen to see if it is safe and effective for you.    Basic Carbohydrate Counting   AMBULATORY CARE:   Carbohydrate counting  is a way to plan your meals by counting the amount of carbohydrate in foods. Carbohydrates are the sugars, starches, and fiber found in fruit, grains, vegetables, and milk products. Carbohydrates increase your blood sugar levels. Carbohydrate counting can help you eat the right amount of carbohydrate to keep your blood sugar levels under control.   What you need to know about planning meals using carbohydrate counting:  A dietitian or healthcare provider will help you develop a healthy meal plan that works best for you. You will be taught how much carbohydrate to eat or drink for each meal and snack. Your meal plan will be based on your age, weight, usual food intake, and physical activity level. If you have diabetes, it will also include your blood sugar levels and diabetes medicine. Once you know how much carbohydrate you should eat, you can decide what type of food you want to eat.    You will need to know what foods contain carbohydrate and how much they contain. Keep track of the amount of carbohydrate in meals and snacks in order to follow your meal plan. Do not avoid carbohydrates or skip meals. Your blood sugar may fall too low if you do not eat enough carbohydrate or you skip meals.    Foods that contain carbohydrate:   Breads:  Each serving of food listed below contains about 15 g of carbohydrate .    1 slice of  bread (1 ounce) or 1 flour or corn tortilla (6 inch)    ½ of a hamburger bun or ¼ of a large bagel (about 1 ounce)    1 pancake (about 4 inches across and ¼ inch thick)    Cereals and grains:  Serving sizes of ready-to-eat cereals vary. Look at the serving size and the total carbohydrate amount listed on the food label. Each serving of food listed below contains about 15 g of carbohydrate .    ¾ cup of dry, unsweetened, ready-to-eat cereal or ¼ cup of low-fat granola     ½ cup of oatmeal or other cooked cereal     ? cup of cooked rice or pasta    Starchy vegetables and beans:  Each serving of food listed below contains about 15 g of carbohydrate .    ½ cup of corn, green peas, sweet potatoes, or mashed potatoes    ¼ of a large baked potato    ½ cup of beans, lentils, and peas (garbanzo, chávez, kidney, white, split, black-eyed)    Crackers and snacks:  Each serving of food listed below contains about 15 g of carbohydrate .    3 linwood cracker squares or 8 animal crackers     6 saltine-type crackers    3 cups of popcorn or ¾ ounce of pretzels, potato chips, or tortilla chips    Fruit:  Each serving of food listed below contains about 15 g of carbohydrate .    1 small (4 ounce) piece of fresh fruit or ¾ to 1 cup of fresh fruit    ½ cup of canned or frozen fruit, packed in natural juice    ½ cup (4 ounces) of unsweetened fruit juice    2 tablespoons of dried fruit    Desserts or sugary foods:  Each serving of food listed below contains about 15 g of carbohydrate .    2-inch square unfrosted cake or brownie     2 small cookies    ½ cup of ice cream, frozen yogurt, or nondairy frozen yogurt    ¼ cup of sherbet or sorbet    1 tablespoon of regular syrup, jam, or jelly    2 tablespoons of light syrup    Milk and yogurt:  Foods from the milk group contain about 12 g of carbohydrate per serving.    1 cup of fat-free or low-fat milk    1 cup of soy milk    ? cup of fat-free, yogurt sweetened with artificial  sweetener    Non-starchy vegetables:  Each serving contains about 5 g of carbohydrate . Three servings of non-starch vegetables count as 1 carbohydrate serving.     ½ cup of cooked vegetables or 1 cup of raw vegetables. This includes beets, broccoli, cabbage, cauliflower, cucumber, mushrooms, tomatoes, and zucchini    ½ cup of vegetable juice    How to use carbohydrate counting to plan meals:   Count carbohydrate amounts using serving sizes:      Pasta dinner example:  You plan to have pasta, tossed salad, and an 8-ounce glass of milk. Your healthcare provider tells you that you may have 4 carbohydrate servings for dinner. One carbohydrate serving of pasta is ? cup. One cup of pasta will equal 3 carbohydrate servings. An 8-ounce glass of milk will count as 1 carbohydrate serving. These amounts of food would equal 4 carbohydrate servings. One cup of tossed salad does not count toward your carbohydrate servings.       Count carbohydrate amounts using food labels:  Find the total amount of carbohydrate in a packaged food by reading the food label. Food labels tell you the serving size of the food and the total carbohydrate amount in each serving. Find the serving size on the food label and then decide how many servings you will eat. Multiply the number of servings you plan to eat by the carbohydrate amount per serving.     Granola bar snack example:  Your meal plan allows you to have 2 carbohydrate servings (30 grams) of carbohydrate for a snack. You plan to eat 1 package of granola bars, which contains 2 bars. According to the food label, the serving size of food in this package is 1 bar. Each serving (1 bar) contains 25 grams of carbohydrate. The total amount of carbohydrate in this package of granola bars would be 50 g. Based on your meal plan, you should eat only 1 bar.      Follow up with your doctor as directed:  Write down your questions so you remember to ask them during your visits.  © Copyright [employer] 2023  Information is for End User's use only and may not be sold, redistributed or otherwise used for commercial purposes.  The above information is an  only. It is not intended as medical advice for individual conditions or treatments. Talk to your doctor, nurse or pharmacist before following any medical regimen to see if it is safe and effective for you.    Foot Care for People with Diabetes   AMBULATORY CARE:   What you need to know about foot care:  Long-term high blood sugar levels can damage the blood vessels and nerves in your legs and feet. This damage makes it hard to feel pressure, pain, temperature, and touch. You may not be able to feel a cut or sore, or shoes that are too tight. Foot care is needed to prevent serious problems, such as an infection or amputation. Diabetes may cause your toes to become crooked or curved under. These changes may affect the way you walk and can lead to increased pressure on your foot. The pressure can decrease blood flow to your feet. Lack of blood flow increases your risk for a foot ulcer.  Call your care team provider if:   Your feet become numb, weak, or hard to move.    You have pus draining from a sore on your foot.    You have a wound on your foot that gets bigger, deeper, or does not heal.    You see blisters, cuts, scratches, calluses, or sores on your foot.    You have a fever, and your feet become red, warm, and swollen.    Your toenails become thick, curled, or yellow.    You find it hard to check your feet because your vision is poor.    You have questions or concerns about your condition or care.    How to care for your feet:   Check your feet each day.  Look at your whole foot, including the bottom, and between and under your toes. Check for wounds, corns, and calluses. Use a mirror to see the bottom of your feet. The skin on your feet may be shiny, tight, or darker than normal. Your feet may also be cold and pale. Feel your feet by running your hands  along the tops, bottoms, sides, and between your toes. Redness, swelling, and warmth are signs of blood flow problems that can lead to a foot ulcer. Do not try to remove corns or calluses yourself. Do not ignore small problems, such as dry skin or small wounds. These can become life-threatening over time without proper care.         Wash your feet each day with soap and warm water.  Do not use hot water, because this can injure your foot. Dry your feet gently with a towel after you wash them. Dry between and under your toes.    Apply lotion or a moisturizer on your dry feet.  Ask your care team provider what lotions are best to use. Do not put lotion or moisturizer between your toes. Moisture between your toes could lead to skin breakdown.    Cut your toenails correctly.  File or cut your toenails straight across. Use a soft brush to clean around your toenails. If your toenails are very thick, you may need to have a care team provider or specialist cut them.     Protect your feet.  Do not walk barefoot or wear your shoes without socks. Check your shoes for rocks or other objects that can hurt your feet. Wear cotton socks to help keep your feet dry. Wear socks without toe seams, or wear them with the seams inside out. Change your socks each day. Do not wear socks that are dirty or damp.    Wear shoes that fit well.  Wear shoes that do not rub against any area of your feet. Your shoes should be ½ to ¾ inch (1 to 2 centimeters) longer than your feet. Your shoes should also have extra space around the widest part of your feet. Walking or athletic shoes with laces or straps that adjust are best. Ask your care team provider for help to choose shoes that fit you best. Ask your provider if you need to wear an insert, orthotic, or bandage on your feet.         Go to your follow-up visits.  Your care team provider will do a foot exam at least 1 time each year. You may need a foot exam more often if you have nerve damage, foot  deformities, or ulcers. Your provider will check for nerve damage and how well you can feel your feet. Your provider will check your shoes to see if they fit well.    Do not smoke.  Smoking can damage your blood vessels and put you at increased risk for foot ulcers. Ask your care team provider for information if you currently smoke and need help to quit. E-cigarettes or smokeless tobacco still contain nicotine. Talk to your care team provider before you use these products.    Follow up with your diabetes care team provider or foot specialist as directed:  You will need to have your feet checked at least 1 time each year. You may need a foot exam more often if you have nerve damage, foot deformities, or ulcers. Write down your questions so you remember to ask them during your visits.  © Copyright [employer] 2023 Information is for End User's use only and may not be sold, redistributed or otherwise used for commercial purposes.  The above information is an  only. It is not intended as medical advice for individual conditions or treatments. Talk to your doctor, nurse or pharmacist before following any medical regimen to see if it is safe and effective for you.    What to Do if Your Blood Sugar is Low   AMBULATORY CARE:   Low blood sugar levels  (hypoglycemia) can happen with Type 1 and Type 2 diabetes. Low levels are more likely to happen if you use insulin. Hypoglycemia can cause you to have falls, accidents, and injuries. A blood sugar level that gets too low can lead to seizures, coma, and death. Learn to recognize the symptoms early so you can get treatment quickly.  When your blood sugar is low you may feel:  Sweaty    Nervous or shaky    Anxious or irritable    Confused    A fast, pounding heartbeat    Extremely hungry    Have someone call your local emergency number (911 in the [place]) if:   You cannot be woken.    You have a seizure.    Call your doctor if:   You have symptoms of a low blood sugar  level, such as trouble thinking, sweating, or a pounding heartbeat.    Your blood sugar level is lower than normal and it does not improve with treatment.     You often have lower blood sugar levels than your target goals.    You have trouble coping with your illness, or you feel anxious or depressed.     You have questions or concerns about your condition or care.    What to do if you have symptoms of low blood sugar:   Check your blood sugar level, if possible.  Your blood sugar level is too low if it is at or below 70 mg/dL.     Eat or drink 15 grams of fast-acting carbohydrate. Fast-acting carbohydrates will raise your blood sugar level quickly. Examples of 15 grams of fast-acting carbohydrates:     4 ounces (½ cup) of fruit juice     4 ounces of regular soda    2 tablespoons of raisins     1 tube of glucose gel or 3 to 4 glucose tablets       Check your blood sugar level 15 minutes later.  If the level is still low (less than 100 mg/dL), eat another 15 grams of carbohydrate. When the level returns to 100 mg/dL, eat a snack or meal that contains carbohydrates. This will help prevent another drop in blood sugar.    Teach people close to you how to use your glucagon kit.  Your blood sugar may be too low for you to be awake. People need to know when and how to use your kit.    Prevent low blood sugar levels:  Prevent low blood sugar by knowing what increases your risk. Ask your healthcare provider for ways to prevent low blood sugar levels. Any of the following can increase your risk of low blood sugar:  Fasting for tests or procedures    During or after intense exercise    Late or postponed meals    Sleeping (you may need a bedtime snack)     Drinking alcohol if you use insulin or insulin releasing pills    Follow up with your doctor as directed:  Write down your questions so you remember to ask them during your visits.  © Copyright [employer] 2023 Information is for End User's use only and may not be sold,  redistributed or otherwise used for commercial purposes.  The above information is an  only. It is not intended as medical advice for individual conditions or treatments. Talk to your doctor, nurse or pharmacist before following any medical regimen to see if it is safe and effective for you.

## 2024-06-17 NOTE — PROGRESS NOTES
Ambulatory Visit  Name: Liudmila Laughlin      : 1965      MRN: [id number]  Encounter Provider: Rachael Jack PA-C  Encounter Date: 2024   Encounter department: [facility] [place]    Assessment & Plan   1. 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  Added metformin 500 mg daily.  Recommend patient follow-up in 1 month in the office and establish with endocrinology.  Referral placed.  Instructed patient to have her diabetic eye exam faxed to our office.   Orders:  -     POCT hemoglobin A1c  -     Lipid panel; Future  -     Albumin / creatinine urine ratio; Future  -     insulin detemir (LEVEMIR) 100 units/mL subcutaneous injection; Inject 100 Units under the skin in the morning Patient taking 100 units in the morning daily 24  -     insulin aspart (NovoLOG) 100 Units/mL injection pen; Inject 30 Units under the skin 2 (two) times a day with meals  -     metFORMIN (GLUCOPHAGE) 500 mg tablet; Take 1 tablet (500 mg total) by mouth daily with breakfast  -     Ambulatory referral to Endocrinology; Future; Expected date: 2024  2. Medicare annual wellness visit, subsequent  3. Encounter for screening mammogram for breast cancer  4. Acute deep vein thrombosis (DVT) of calf muscle vein of left lower extremity (HCC)  Assessment & Plan:  Continue Xarelto  5. Mild intermittent asthma without complication  Assessment & Plan:  Stable.  Continue albuterol  Orders:  -     albuterol (PROVENTIL HFA,VENTOLIN HFA) 90 mcg/act inhaler; Inhale 2 puffs every 6 (six) hours as needed for wheezing  6. Anxiety and depression  Assessment & Plan:  Continue Xanax as needed  7. Elevated blood pressure reading  Assessment & Plan:  Restart lisinopril 20 mg daily.  Recommend BP check with nurse visit in 1 week and reevaluation in the office in a month.  8. Obesity,  morbid (HCC)  -     Lipid panel; Future  9. Primary hypertension  Assessment & Plan:  Restart lisinopril 20 mg daily.  Will have the patient follow-up in 1 week for BP check and follow-up in the office in 1 month.  Orders:  -     lisinopril (ZESTRIL) 20 mg tablet; Take 1 tablet (20 mg total) by mouth daily Will take again once prescription renews - PCP visit tomorrow  10. Clear cell renal cell carcinoma, left (HCC)  Assessment & Plan:  Continue follow-up with oncology  11. Screening for HIV (human immunodeficiency virus)  -     HIV 1/2 AG/AB w Reflex [facility] for 2 yr old and above; Future  12. Need for hepatitis C screening test  -     Hepatitis C Antibody; Future      Depression Screening and Follow-up Plan: Patient was screened for depression during today's encounter. They screened negative with a PHQ-9 score of 4.      Preventive health issues were discussed with patient, and age appropriate screening tests were ordered as noted in patient's After Visit Summary. Personalized health advice and appropriate referrals for health education or preventive services given if needed, as noted in patient's After Visit Summary.    History of Present Illness   {Disappearing Hyperlinks I Encounters * My Last Note * Since Last Visit * History :18417}  Liudmila is a new patient establishing care.  POCT A1c 10.5  History of renal cell carcinoma of the left.  Follows with oncology.  Had an ablation of the kidney tumor.  No chemo or radiation.  The patient is on long and short acting insulin for diabetes but no oral medications at this time.  Used to be on metformin but was stopped because she was told there are significant side effects.  Her fasting sugars are often in the 200s.  She had a mammogram and Pap smear done about 6 months ago through [facility].  She follows with [facility] where she gets her diabetic eye exams done.  She also follows with a podiatrist.  She takes Xanax about 3-4 times a week as needed  for panic attacks.  She follows with cardiology for history of heart attack in 2020.  She has a history of DVT after surgery in 2013 or 2014  She used to be on lisinopril but has been off of it for about a year since she lost follow-up with her provider.  She states will be getting a form for a seat for her power chair.         Patient Care Team:  Rachael Jack PA-C as PCP - General (Family Medicine)  Margarita Corley DO as PCP - PCP-[facility] ([facility])  MD Migel Rock MD Kristin Joy Marek, MD (Family Medicine)    Review of Systems   Constitutional:  Negative for chills and fever.   HENT:  Negative for congestion and sore throat.    Eyes:  Negative for pain and visual disturbance.   Respiratory:  Negative for cough and shortness of breath.    Cardiovascular:  Negative for chest pain and palpitations.   Gastrointestinal:  Negative for abdominal pain, constipation and diarrhea.   Genitourinary:  Negative for dysuria and hematuria.   Musculoskeletal:  Negative for arthralgias and myalgias.   Skin:  Negative for color change and rash.   Neurological:  Negative for dizziness and headaches.     Medical History Reviewed by provider this encounter:  Tobacco  Allergies  Meds  Problems  Med Hx  Surg Hx  Fam Hx       Annual Wellness Visit Questionnaire   Liudmila is here for her Subsequent Wellness visit.     Health Risk Assessment:   Patient rates overall health as good. Patient feels that their physical health rating is same. Patient is satisfied with their life. Eyesight was rated as slightly worse. Hearing was rated as same. Patient feels that their emotional and mental health rating is same. Patients states they are sometimes angry. Patient states they are sometimes unusually tired/fatigued. Pain experienced in the last 7 days has been a lot. Patient's pain rating has been 9/10. Patient states that she has experienced no weight loss or gain in last 6 months.     Depression Screening:   PHQ-9  Score: 4      Fall Risk Screening:   In the past year, patient has experienced: history of falling in past year    Injured during fall?: Yes    Feels unsteady when standing or walking?: Yes    Worried about falling?: Yes      Urinary Incontinence Screening:   Patient has leaked urine accidently in the last six months.     Home Safety:  Patient does not have trouble with stairs inside or outside of their home. Patient has working smoke alarms and has working carbon monoxide detector. Home safety hazards include: none.     Nutrition:   Current diet is Regular.     Medications:   Patient is not currently taking any over-the-counter supplements. Patient is able to manage medications.     Activities of Daily Living (ADLs)/Instrumental Activities of Daily Living (IADLs):   Walk and transfer into and out of bed and chair?: No  Dress and groom yourself?: Yes    Bathe or shower yourself?: Yes    Feed yourself? Yes  Do your laundry/housekeeping?: No  Manage your money, pay your bills and track your expenses?: Yes  Make your own meals?: No    Do your own shopping?: No    Previous Hospitalizations:   Any hospitalizations or ED visits within the last 12 months?: No      Advance Care Planning:   Living will: No    Durable POA for healthcare: No    Advanced directive: No      PREVENTIVE SCREENINGS      Cardiovascular Screening:    General: Screening Current      Diabetes Screening:     General: Screening Not Indicated and History Diabetes      Breast Cancer Screening:     General: Screening Current      Lung Cancer Screening:     General: Screening Not Indicated    Screening, Brief Intervention, and Referral to Treatment (SBIRT)    Screening  Typical number of drinks in a day: 0  Typical number of drinks in a week: 0  Interpretation: Low risk drinking behavior.    AUDIT-C Screenin) How often did you have a drink containing alcohol in the past year? never  2) How many drinks did you have on a typical day when you were drinking  in the past year? 0  3) How often did you have 6 or more drinks on one occasion in the past year? never    AUDIT-C Score: 0  Interpretation: Score 0-2 (female): Negative screen for alcohol misuse    Single Item Drug Screening:  How often have you used an illegal drug (including marijuana) or a prescription medication for non-medical reasons in the past year? never    Single Item Drug Screen Score: 0  Interpretation: Negative screen for possible drug use disorder    Review of Current Opioid Use    Opioid Risk Tool (ORT) Interpretation: Complete Opioid Risk Tool (ORT)    Social Determinants of Health     Financial Resource Strain: Medium Risk (12/29/2022)    [facility]    Overall Financial Resource Strain (CARDIA)    • Difficulty of Paying Living Expenses: Somewhat hard   Food Insecurity: Food Insecurity Present (12/29/2022)    [facility]    Hunger Vital Sign    • Worried About Running Out of Food in the Last Year: Patient declined    • Ran Out of Food in the Last Year: Sometimes true   Transportation Needs: Unmet Transportation Needs (12/29/2022)    [facility]    PRAPARE - Transportation    • Lack of Transportation (Medical): Yes    • Lack of Transportation (Non-Medical): Yes   Housing Stability: High Risk (12/29/2022)    [facility]    Housing Stability Vital Sign    • Unable to Pay for Housing in the Last Year: Yes    • Unstable Housing in the Last Year: No     No results found.    Objective   {Disappearing Hyperlinks   Review Vitals * Enter New Vitals * Results Review * Labs * Imaging * Cardiology * Procedures * Lung Cancer Screening :15450}  /88 (BP Location: Right arm, Patient Position: Sitting, Cuff Size: Standard)   Pulse 98   Temp 98.5 °F (36.9 °C)  (Tympanic)   Resp 18   SpO2 94%     Physical Exam  Vitals and nursing note reviewed.   Constitutional:       General: She is not in acute distress.     Appearance: She is well-developed.   HENT:      Head: Normocephalic and atraumatic.      Right Ear: Tympanic membrane normal.      Left Ear: Tympanic membrane normal.      Nose: Nose normal.      Mouth/Throat:      Mouth: Mucous membranes are moist.      Pharynx: Oropharynx is clear. No oropharyngeal exudate.   Eyes:      Extraocular Movements: Extraocular movements intact.      Conjunctiva/sclera: Conjunctivae normal.   Cardiovascular:      Rate and Rhythm: Normal rate and regular rhythm.      Heart sounds: No murmur heard.  Pulmonary:      Effort: Pulmonary effort is normal. No respiratory distress.      Breath sounds: Normal breath sounds. No wheezing, rhonchi or rales.   Abdominal:      Palpations: Abdomen is soft.      Tenderness: There is no abdominal tenderness.   Musculoskeletal:         General: No swelling.      Cervical back: Neck supple.   Skin:     General: Skin is warm and dry.      Capillary Refill: Capillary refill takes less than 2 seconds.   Neurological:      General: No focal deficit present.      Mental Status: She is alert.   Psychiatric:         Mood and Affect: Mood normal.       Administrative Statements {Disappearing Hyperlinks I  Level of Service * [facility]}

## 2024-06-17 NOTE — ASSESSMENT & PLAN NOTE
Lab Results   Component Value Date    HGBA1C 10.5 (A) 06/17/2024   Continue insulin detemir and NovoLog.  Added metformin 500 mg daily.  Recommend patient follow-up in 1 month in the office and establish with endocrinology.  Referral placed.  Instructed patient to have her diabetic eye exam faxed to our office.

## 2024-06-19 ENCOUNTER — TELEPHONE (OUTPATIENT)
Age: 59
End: 2024-06-19

## 2024-06-19 NOTE — TELEPHONE ENCOUNTER
Patient called asking if an order was received for a new seat for her mobility chair.  I didn't see anything in media so provided the fax # for her to give to the DME.  Please look out for it.    Also, patient wanted the clerical staff to know that she changed the doctor on her insurance ID care to Howie Moralesezekiel.

## 2024-06-20 ENCOUNTER — TELEPHONE (OUTPATIENT)
Age: 59
End: 2024-06-20

## 2024-06-20 DIAGNOSIS — E11.49 TYPE II DIABETES MELLITUS WITH NEUROLOGICAL MANIFESTATIONS (HCC): ICD-10-CM

## 2024-06-20 RX ORDER — INSULIN DEGLUDEC 100 U/ML
10 INJECTION, SOLUTION SUBCUTANEOUS DAILY
Qty: 3 ML | Refills: 3 | Status: SHIPPED | OUTPATIENT
Start: 2024-06-20 | End: 2024-06-20 | Stop reason: SDUPTHER

## 2024-06-20 RX ORDER — INSULIN DEGLUDEC 100 U/ML
100 INJECTION, SOLUTION SUBCUTANEOUS DAILY
Qty: 3 ML | Refills: 20 | Status: SHIPPED | OUTPATIENT
Start: 2024-06-20 | End: 2024-06-21 | Stop reason: SDUPTHER

## 2024-06-20 NOTE — TELEPHONE ENCOUNTER
Patient called to follow up on form that was to be fax to the office yesterday.    Patient would like a return call if the form was NOT received.

## 2024-06-20 NOTE — TELEPHONE ENCOUNTER
[facility] pharmacy called stating the new script sent over for Tresiba should say 100 units not 10 units. Please ask Rachael Jack to make correction and send to pharmacy. Patient is out of insulin.

## 2024-06-20 NOTE — TELEPHONE ENCOUNTER
Patient called for update. She stated it was faxed on Monday and then again yesterday. I did inform her it takes a few days for the faxes to get  into the proper patient charts. Patient requests a call back to update.

## 2024-06-20 NOTE — TELEPHONE ENCOUNTER
Patient was just told that her   insulin detemir (LEVEMIR)    Is longer covered unless she can get an exception sent by the dr. She has no medication as of now (missed this morning dose) and needs to know what she can do or be prescribed. Please advise

## 2024-06-20 NOTE — TELEPHONE ENCOUNTER
[facility] called in stating patients   insulin detemir (LEVEMIR) 100 units/mL subcutaneous injection   Is not covered by her insurance.  Insurance suggested Lantus be ordered, Please advise and send new script. Patient is all out of medication daughter was at the pharmacy to  meds.

## 2024-06-20 NOTE — TELEPHONE ENCOUNTER
Transmission to pharmacy error occurred. Medication resent to pharmacy. Receipt confirmed by pharmacy.  Sent to pharmacy (6/20/2024  4:11 PM EDT)

## 2024-06-21 ENCOUNTER — TELEPHONE (OUTPATIENT)
Age: 59
End: 2024-06-21

## 2024-06-21 DIAGNOSIS — E11.49 TYPE II DIABETES MELLITUS WITH NEUROLOGICAL MANIFESTATIONS (HCC): ICD-10-CM

## 2024-06-21 RX ORDER — INSULIN DEGLUDEC 100 U/ML
100 INJECTION, SOLUTION SUBCUTANEOUS DAILY
Qty: 3 ML | Refills: 20 | Status: SHIPPED | OUTPATIENT
Start: 2024-06-21 | End: 2024-08-20

## 2024-06-21 NOTE — TELEPHONE ENCOUNTER
Patient was put on Insulin degludec (Tresiba Flex Touch). Patient is refusing to take this medication because she does not know what it is. She wants to be put back on Levemir because that is what she has been on for the last 10 years.  If put back on the Levemir, can a prior authorization be started ASA so the patient could possibly have insulin for the weekend.     Medication can be sent to [email or web address], [phone].    Please advise, thank you.

## 2024-06-21 NOTE — TELEPHONE ENCOUNTER
Patient. Notified we received form for seating and mobility and she stated she needs prior auth for her levemir as she does not want the trulicity stated she will be out of insulin

## 2024-06-21 NOTE — TELEPHONE ENCOUNTER
I spoke to the Patient's insurance [employer] and with the [facility] they both told me that the medication is not covered but the alternative is the Tresiba which is covered at 100% and she does not have to pay anything. I spoke to Niharika from [facility] and she told me that the Levimier will be discontinued by the manufacture and so at some point she will need a new insulin because it is whatever they have in stock and they are not making anymore     I spoke to the patient and she understand that Levimeir is not covered and she will have to  the levimeir she was upset because she did not have a choice because the Levimer was being discontinued. I told her the Tresiba is ready for her to  at pharmacy and call ended the call abruptly.     Also about the paperwork for the chair I informed the patient we received the paperwork and our office takes 7 to 10 business days to complete them.

## 2024-06-21 NOTE — TELEPHONE ENCOUNTER
I spoke to the Patient's insurance [employer] and with the [facility] they both told me that the medication is not covered but the alternative is the Tresiba which is covered at 100% and she does not have to pay anything. I spoke to Niharika from [facility] and she told me that the Levimier will be discontinued by the manufacture and so at some point she will need a new insulin because it is whatever they have in stock and they are not making anymore     I spoke to the patient and she understand that Levimeir is not covered and she will have to  the levimeir she was upset because she did not have a choice because the Levimer was being discontinued. I told her the Tresiba is ready for her to  at pharmacy and call ended the call abruptly.     Also about the paperwork for the chair I informed the patient we received the paperwork and our office takes 7 to 10 business days to complete them.

## 2024-06-21 NOTE — TELEPHONE ENCOUNTER
I spoke to the Patient's insurance [employer] and with the [facility] they both told me that the medication is not covered but the alternative is the Tresiba which is covered at 100% and she does not have to pay anything. I spoke to Niharika from [facility] and she told me that the Levimier will be discontinued by the manufacture and so at some point she will need a new insulin because it is whatever they have in stock and they are not making anymore     I spoke to the patient and she understand that Levimeir is not covered and she will have to  the levimeir she was upset because she did not have a choice because the Levimer was being discontinued. I told her the Tresiba is ready for her to  at pharmacy and call ended the call abruptly.     Also about the paperwork for the chair I informed the patient we received the paperwork and our office takes 7 to 10 business days to complete them.

## 2024-06-24 NOTE — TELEPHONE ENCOUNTER
Patient called in to follow up on forms for chair. I informed patient office did receive paperwork and it could take 7-10 business days. Warm transferred to Alize for further questions.

## 2024-07-15 ENCOUNTER — TELEPHONE (OUTPATIENT)
Age: 59
End: 2024-07-15

## 2024-07-15 ENCOUNTER — RA CDI HCC (OUTPATIENT)
Dept: OTHER | Facility: HOSPITAL | Age: 59
End: 2024-07-15

## 2024-07-15 DIAGNOSIS — Z86.718 PERSONAL HISTORY OF DVT (DEEP VEIN THROMBOSIS): Primary | ICD-10-CM

## 2024-07-15 NOTE — TELEPHONE ENCOUNTER
WANTS  TO  KNOW  IF  WE  RECEIVE   A  FAX   FROM   [employer]  ON   FRIDAY   SHE  WOULD  LIKE  AN  UPDATE

## 2024-07-15 NOTE — PROGRESS NOTES
HCC coding opportunities          Chart Reviewed number of suggestions sent to Provider: 2  E11.65  T84.091     Patients Insurance     Medicare Insurance: [employer]

## 2024-07-15 NOTE — TELEPHONE ENCOUNTER
Pt   called   back    said  she  doesn't  need  that  form  filled  out  for  the [employer]   now   they  worked  out   something  else  for  her    but  she  wants  the  form   kept  in  her   file   incase   see  needs  it   again

## 2024-07-15 NOTE — TELEPHONE ENCOUNTER
Spoke to Alize VILLAGOMEZ and she said she spoke to the pt- that our office didn't receive the Fax from [employer]

## 2024-07-16 DIAGNOSIS — E11.49 TYPE II DIABETES MELLITUS WITH NEUROLOGICAL MANIFESTATIONS (HCC): ICD-10-CM

## 2024-07-16 DIAGNOSIS — I10 PRIMARY HYPERTENSION: ICD-10-CM

## 2024-07-16 RX ORDER — LISINOPRIL 20 MG/1
20 TABLET ORAL DAILY
Qty: 100 TABLET | Refills: 1 | Status: SHIPPED | OUTPATIENT
Start: 2024-07-16

## 2024-07-17 DIAGNOSIS — E11.49 TYPE II DIABETES MELLITUS WITH NEUROLOGICAL MANIFESTATIONS (HCC): ICD-10-CM

## 2024-07-17 NOTE — TELEPHONE ENCOUNTER
Reason for call:   [x] Refill   [] Prior Auth  [] Other:     Office:   [x] PCP/Provider -   [] Specialty/Provider -     Medication: insulin aspart (NovoLOG) 100 Units/mL injection pen     Dose/Frequency: Inject 30 Units under the skin 2 (two) times a day with meals     Quantity: 15 mL     Pharmacy: [facility]/pharmacy #[id number] - [facility] [address] - [address] [place] [phone]    Does the patient have enough for 3 days?   [x] Yes   [] No - Send as HP to POD

## 2024-07-18 RX ORDER — ASPIRIN 81 MG/1
81 TABLET, CHEWABLE ORAL DAILY
Qty: 30 TABLET | Refills: 0 | Status: SHIPPED | OUTPATIENT
Start: 2024-07-18 | End: 2024-09-16

## 2024-07-25 NOTE — TELEPHONE ENCOUNTER
Patient called and rescheduled appointment to Monday 07/29/24.    Liudmila then stated there is a form being held at office from [facility] [employer].  She now needs this form filled out and sent back to her as soon as possible or she will loose power.      Contact patient accordingly.

## 2024-07-26 ENCOUNTER — TELEPHONE (OUTPATIENT)
Age: 59
End: 2024-07-26

## 2024-07-26 NOTE — TELEPHONE ENCOUNTER
"Pt called in regarding [employer] notice    pt was made aware to call [employer] to have them fax paperwork to office    supplied pt with fax number to be faxed to   pt aware no forms are here or in pt chart for \"when she needs them\"   pt made aware to call office after speaking with [employer] to update about forms  "

## 2024-07-26 NOTE — TELEPHONE ENCOUNTER
Pt was called and advised that [employer] form was faxed to [phone] with confirmation    pt was also made aware that this is a one time courtesy

## 2024-07-26 NOTE — TELEPHONE ENCOUNTER
Patient called for update on [employer] form. She is requesting to speak with someone in office for further clarification. Spoke with [facility], she stated to let the patient know they are working on it. Patient requests a call back when form is faxed to [facility].

## 2024-07-26 NOTE — TELEPHONE ENCOUNTER
Patient says that she received an email from [employer] that a fax was sent for keep her power on .  She is very upset and crying.  She needs reassurance that the fax was received.  She also wants to make sure that once this is completed and sent back, that a copy of the form is in her chart.  Please contact patient and advise.  Thank you.

## 2024-07-26 NOTE — TELEPHONE ENCOUNTER
Patient called back  stating that  [employer] faxed over another form today ..    Advised they will keep an eye out for the form

## 2024-07-26 NOTE — TELEPHONE ENCOUNTER
Below is copied from another encounter so that everything is in one place        7/26/24  9:14 AM  Liudmila Laughlin contacted Nadja Tarango         7/26/24  9:17 AM  Note     Patient says that she received an email from [email or web address] that a fax was sent for keep her power on .  She is very upset and crying.  She needs reassurance that the fax was received.  She also wants to make sure that once this is completed and sent back, that a copy of the form is in her chart.  Please contact patient and advise.  Thank you.             CG    7/26/24  9:17 AM  Nadja Tarango routed this conversation to [facility]

## 2024-07-28 DIAGNOSIS — E11.49 TYPE II DIABETES MELLITUS WITH NEUROLOGICAL MANIFESTATIONS (HCC): ICD-10-CM

## 2024-07-29 RX ORDER — ASPIRIN 81 MG
81 TABLET,CHEWABLE ORAL DAILY
Qty: 30 TABLET | Refills: 0 | Status: SHIPPED | OUTPATIENT
Start: 2024-07-29

## 2024-07-30 ENCOUNTER — TELEPHONE (OUTPATIENT)
Age: 59
End: 2024-07-30

## 2024-07-30 NOTE — TELEPHONE ENCOUNTER
Patient called - was at the ER yesterday.  Would like Rachael to review her scan and other labs, etc - they found a renal cyst. She will be in to see her tomorrow, 7/31 at 12:40.

## 2024-07-31 ENCOUNTER — TELEPHONE (OUTPATIENT)
Age: 59
End: 2024-07-31

## 2024-08-01 ENCOUNTER — TELEPHONE (OUTPATIENT)
Age: 59
End: 2024-08-01

## 2024-08-01 DIAGNOSIS — E11.49 TYPE II DIABETES MELLITUS WITH NEUROLOGICAL MANIFESTATIONS (HCC): Primary | ICD-10-CM

## 2024-08-01 RX ORDER — BLOOD-GLUCOSE METER
EACH MISCELLANEOUS
Qty: 1 KIT | Refills: 0 | Status: SHIPPED | OUTPATIENT
Start: 2024-08-01

## 2024-08-01 RX ORDER — LANCETS 33 GAUGE
EACH MISCELLANEOUS
Qty: 300 EACH | Refills: 3 | Status: SHIPPED | OUTPATIENT
Start: 2024-08-01

## 2024-08-01 RX ORDER — LANCETS
EACH MISCELLANEOUS
Status: CANCELLED | OUTPATIENT
Start: 2024-08-01

## 2024-08-01 RX ORDER — BLOOD-GLUCOSE METER
EACH MISCELLANEOUS
Refills: 0 | Status: CANCELLED | OUTPATIENT
Start: 2024-08-01

## 2024-08-01 RX ORDER — BLOOD SUGAR DIAGNOSTIC
STRIP MISCELLANEOUS
Qty: 300 EACH | Refills: 3 | Status: SHIPPED | OUTPATIENT
Start: 2024-08-01
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
## 2024-10-13 DIAGNOSIS — I10 PRIMARY HYPERTENSION: ICD-10-CM

## 2024-10-15 RX ORDER — LISINOPRIL 20 MG/1
20 TABLET ORAL DAILY
Qty: 90 TABLET | Refills: 1 | Status: SHIPPED | OUTPATIENT
Start: 2024-10-15

## 2024-11-19 ENCOUNTER — VBI (OUTPATIENT)
Dept: ADMINISTRATIVE | Facility: OTHER | Age: 59
End: 2024-11-19

## 2024-11-19 NOTE — TELEPHONE ENCOUNTER
11/19/24 7:28 AM     Chart reviewed for Diabetic Eye Exam, eGFR, Hemoglobin A1c, and Microalbumin Creatinine Urine Ratio OR Albumin Creatinine Urine Ratio  ; nothing is submitted to the patient's insurance at this time.     Audi Rhodes MA   [facility]

## (undated) DEVICE — NEEDLE SPINAL18G X 3.5 IN QUINCKE

## (undated) DEVICE — Device

## (undated) DEVICE — DRAPE SHEET THREE QUARTER

## (undated) DEVICE — TRAY FOLEY 16FR URIMETER SURESTEP

## (undated) DEVICE — NEEDLE BLUNT 18 G X 1 1/2IN

## (undated) DEVICE — OCCLUSIVE GAUZE STRIP,3% BISMUTH TRIBROMOPHENATE IN PETROLATUM BLEND: Brand: XEROFORM

## (undated) DEVICE — INTENDED FOR TISSUE SEPARATION, AND OTHER PROCEDURES THAT REQUIRE A SHARP SURGICAL BLADE TO PUNCTURE OR CUT.: Brand: BARD-PARKER SAFETY BLADES SIZE 10, STERILE

## (undated) DEVICE — CATHETER ANGIO 10G X 3IN

## (undated) DEVICE — FLOSEAL HEMOSTATIC MATRIX, 5 ML: Brand: FLOSEAL

## (undated) DEVICE — SUT MONOCRYL 2-0 SH 27 IN Y417H

## (undated) DEVICE — INTENDED FOR TISSUE SEPARATION, AND OTHER PROCEDURES THAT REQUIRE A SHARP SURGICAL BLADE TO PUNCTURE OR CUT.: Brand: BARD-PARKER ® SAFETYLOCK CARBON RIB-BACK BLADES

## (undated) DEVICE — BETHLEHEM UNIVERSAL SPINE, KIT: Brand: CARDINAL HEALTH

## (undated) DEVICE — STERILE POLYISOPRENE POWDER-FREE SURGICAL GLOVES: Brand: PROTEXIS

## (undated) DEVICE — PENCIL ELECTROSURG E-Z CLEAN -0035H

## (undated) DEVICE — GAUZE SPONGES,16 PLY: Brand: CURITY

## (undated) DEVICE — VIOLET BRAIDED (POLYGLACTIN 910), SYNTHETIC ABSORBABLE SUTURE: Brand: COATED VICRYL

## (undated) DEVICE — PROXIMATE SKIN STAPLERS (35 WIDE) CONTAINS 35 STAINLESS STEEL STAPLES (FIXED HEAD): Brand: PROXIMATE

## (undated) DEVICE — SYRINGE 5ML LL

## (undated) DEVICE — MAYO STAND COVER: Brand: CONVERTORS

## (undated) DEVICE — ABDOMINAL PAD: Brand: DERMACEA

## (undated) DEVICE — 3M™ DURAPORE™ SURGICAL TAPE 1538-3, 3 INCH X 10 YARD (7,5CM X 9,1M), 4 ROLLS/BOX: Brand: 3M™ DURAPORE™

## (undated) DEVICE — STERILE SYNTHETIC NEOPRENE POWDER-FREE SURGICAL GLOVES WITH NITRILE COATING, SMOOTH FINISH, STRAIGHT FINGER: Brand: PROTEXIS

## (undated) DEVICE — DRAPE STERI 1010 18IN X 12IN

## (undated) DEVICE — MINOR PROCEDURE DRAPE: Brand: CONVERTORS

## (undated) DEVICE — SMARTGOWN SURGICAL GOWN, XL, LONG: Brand: CONVERTORS

## (undated) DEVICE — SCD SEQUENTIAL COMPRESSION COMFORT SLEEVE MEDIUM KNEE LENGTH: Brand: KENDALL SCD

## (undated) DEVICE — DRAPE C-ARM X-RAY

## (undated) DEVICE — NEURO PATTIES 1/2 X 1 1/2

## (undated) DEVICE — SURGICAL CLIPPER BLADE GENERAL USE

## (undated) DEVICE — 3M™ IOBAN™ 2 ANTIMICROBIAL INCISE DRAPE 6650EZ: Brand: IOBAN™ 2

## (undated) DEVICE — 3L THIN WALL CAN: Brand: CRD

## (undated) DEVICE — CHLORAPREP HI-LITE 26ML ORANGE

## (undated) DEVICE — STERILE POLYISOPRENE POWDER-FREE SURGICAL GLOVES WITH EMOLLIENT COATING: Brand: PROTEXIS